# Patient Record
Sex: FEMALE | Race: WHITE | NOT HISPANIC OR LATINO | Employment: OTHER | URBAN - METROPOLITAN AREA
[De-identification: names, ages, dates, MRNs, and addresses within clinical notes are randomized per-mention and may not be internally consistent; named-entity substitution may affect disease eponyms.]

---

## 2017-01-24 ENCOUNTER — ALLSCRIPTS OFFICE VISIT (OUTPATIENT)
Dept: OTHER | Facility: OTHER | Age: 82
End: 2017-01-24

## 2017-03-14 ENCOUNTER — APPOINTMENT (EMERGENCY)
Dept: RADIOLOGY | Facility: HOSPITAL | Age: 82
DRG: 291 | End: 2017-03-14
Payer: MEDICARE

## 2017-03-14 ENCOUNTER — HOSPITAL ENCOUNTER (INPATIENT)
Facility: HOSPITAL | Age: 82
LOS: 10 days | Discharge: NURSING, REHAB OR LONG-TERM CARE | DRG: 291 | End: 2017-03-24
Attending: EMERGENCY MEDICINE | Admitting: INTERNAL MEDICINE
Payer: MEDICARE

## 2017-03-14 DIAGNOSIS — I50.9 ACUTE ON CHRONIC CONGESTIVE HEART FAILURE (HCC): Primary | ICD-10-CM

## 2017-03-14 DIAGNOSIS — R09.02 HYPOXIA: ICD-10-CM

## 2017-03-14 DIAGNOSIS — J96.01 ACUTE RESPIRATORY FAILURE WITH HYPOXIA (HCC): ICD-10-CM

## 2017-03-14 DIAGNOSIS — R93.89 ABNORMAL CHEST CT: ICD-10-CM

## 2017-03-14 PROBLEM — J44.9 COPD (CHRONIC OBSTRUCTIVE PULMONARY DISEASE) (HCC): Status: ACTIVE | Noted: 2017-03-14

## 2017-03-14 PROBLEM — I50.33 ACUTE ON CHRONIC DIASTOLIC (CONGESTIVE) HEART FAILURE (HCC): Status: ACTIVE | Noted: 2017-03-14

## 2017-03-14 PROBLEM — Z97.8 CHRONIC INDWELLING FOLEY CATHETER: Status: ACTIVE | Noted: 2017-03-14

## 2017-03-14 LAB
ANION GAP SERPL CALCULATED.3IONS-SCNC: 6 MMOL/L (ref 4–13)
BACTERIA UR QL AUTO: ABNORMAL /HPF
BASOPHILS # BLD AUTO: 0.04 THOUSANDS/ΜL (ref 0–0.1)
BASOPHILS NFR BLD AUTO: 1 % (ref 0–1)
BILIRUB UR QL STRIP: NEGATIVE
BUN SERPL-MCNC: 21 MG/DL (ref 5–25)
CALCIUM SERPL-MCNC: 9.4 MG/DL (ref 8.3–10.1)
CHLORIDE SERPL-SCNC: 99 MMOL/L (ref 100–108)
CLARITY UR: ABNORMAL
CO2 SERPL-SCNC: 37 MMOL/L (ref 21–32)
COLOR UR: ABNORMAL
CREAT SERPL-MCNC: 1.3 MG/DL (ref 0.6–1.3)
EOSINOPHIL # BLD AUTO: 0.27 THOUSAND/ΜL (ref 0–0.61)
EOSINOPHIL NFR BLD AUTO: 4 % (ref 0–6)
ERYTHROCYTE [DISTWIDTH] IN BLOOD BY AUTOMATED COUNT: 17.1 % (ref 11.6–15.1)
GFR SERPL CREATININE-BSD FRML MDRD: 39.2 ML/MIN/1.73SQ M
GLUCOSE SERPL-MCNC: 109 MG/DL (ref 65–140)
GLUCOSE SERPL-MCNC: 117 MG/DL (ref 65–140)
GLUCOSE UR STRIP-MCNC: NEGATIVE MG/DL
HCT VFR BLD AUTO: 44.1 % (ref 34.8–46.1)
HGB BLD-MCNC: 13.8 G/DL (ref 11.5–15.4)
HGB UR QL STRIP.AUTO: ABNORMAL
KETONES UR STRIP-MCNC: NEGATIVE MG/DL
LEUKOCYTE ESTERASE UR QL STRIP: ABNORMAL
LYMPHOCYTES # BLD AUTO: 1.04 THOUSANDS/ΜL (ref 0.6–4.47)
LYMPHOCYTES NFR BLD AUTO: 16 % (ref 14–44)
MCH RBC QN AUTO: 30.5 PG (ref 26.8–34.3)
MCHC RBC AUTO-ENTMCNC: 31.3 G/DL (ref 31.4–37.4)
MCV RBC AUTO: 98 FL (ref 82–98)
MONOCYTES # BLD AUTO: 0.67 THOUSAND/ΜL (ref 0.17–1.22)
MONOCYTES NFR BLD AUTO: 10 % (ref 4–12)
NEUTROPHILS # BLD AUTO: 4.41 THOUSANDS/ΜL (ref 1.85–7.62)
NEUTS SEG NFR BLD AUTO: 69 % (ref 43–75)
NITRITE UR QL STRIP: NEGATIVE
NON-SQ EPI CELLS URNS QL MICRO: ABNORMAL /HPF
NT-PROBNP SERPL-MCNC: 2059 PG/ML
PH UR STRIP.AUTO: 7.5 [PH] (ref 4.5–8)
PLATELET # BLD AUTO: 314 THOUSANDS/UL (ref 149–390)
PMV BLD AUTO: 9 FL (ref 8.9–12.7)
POTASSIUM SERPL-SCNC: 3.5 MMOL/L (ref 3.5–5.3)
PROT UR STRIP-MCNC: NEGATIVE MG/DL
RBC # BLD AUTO: 4.52 MILLION/UL (ref 3.81–5.12)
RBC #/AREA URNS AUTO: ABNORMAL /HPF
SODIUM SERPL-SCNC: 142 MMOL/L (ref 136–145)
SP GR UR STRIP.AUTO: 1.01 (ref 1–1.03)
TROPONIN I SERPL-MCNC: 0.03 NG/ML
TROPONIN I SERPL-MCNC: 0.05 NG/ML
UROBILINOGEN UR QL STRIP.AUTO: 0.2 E.U./DL
WBC # BLD AUTO: 6.43 THOUSAND/UL (ref 4.31–10.16)
WBC #/AREA URNS AUTO: ABNORMAL /HPF
WBC CLUMPS # UR AUTO: PRESENT /UL

## 2017-03-14 PROCEDURE — 87186 SC STD MICRODIL/AGAR DIL: CPT | Performed by: INTERNAL MEDICINE

## 2017-03-14 PROCEDURE — 80048 BASIC METABOLIC PNL TOTAL CA: CPT | Performed by: EMERGENCY MEDICINE

## 2017-03-14 PROCEDURE — 36415 COLL VENOUS BLD VENIPUNCTURE: CPT | Performed by: EMERGENCY MEDICINE

## 2017-03-14 PROCEDURE — 85025 COMPLETE CBC W/AUTO DIFF WBC: CPT | Performed by: EMERGENCY MEDICINE

## 2017-03-14 PROCEDURE — 84484 ASSAY OF TROPONIN QUANT: CPT | Performed by: INTERNAL MEDICINE

## 2017-03-14 PROCEDURE — 87077 CULTURE AEROBIC IDENTIFY: CPT | Performed by: INTERNAL MEDICINE

## 2017-03-14 PROCEDURE — 84484 ASSAY OF TROPONIN QUANT: CPT | Performed by: EMERGENCY MEDICINE

## 2017-03-14 PROCEDURE — 99285 EMERGENCY DEPT VISIT HI MDM: CPT

## 2017-03-14 PROCEDURE — 96374 THER/PROPH/DIAG INJ IV PUSH: CPT

## 2017-03-14 PROCEDURE — 82948 REAGENT STRIP/BLOOD GLUCOSE: CPT

## 2017-03-14 PROCEDURE — 83880 ASSAY OF NATRIURETIC PEPTIDE: CPT | Performed by: EMERGENCY MEDICINE

## 2017-03-14 PROCEDURE — 71010 HB CHEST X-RAY 1 VIEW FRONTAL (PORTABLE): CPT

## 2017-03-14 PROCEDURE — 93005 ELECTROCARDIOGRAM TRACING: CPT | Performed by: EMERGENCY MEDICINE

## 2017-03-14 PROCEDURE — 81001 URINALYSIS AUTO W/SCOPE: CPT | Performed by: INTERNAL MEDICINE

## 2017-03-14 PROCEDURE — 87086 URINE CULTURE/COLONY COUNT: CPT | Performed by: INTERNAL MEDICINE

## 2017-03-14 RX ORDER — LORAZEPAM 0.5 MG/1
0.5 TABLET ORAL EVERY 6 HOURS PRN
Status: DISCONTINUED | OUTPATIENT
Start: 2017-03-14 | End: 2017-03-24 | Stop reason: HOSPADM

## 2017-03-14 RX ORDER — ISOSORBIDE MONONITRATE 60 MG/1
60 TABLET, EXTENDED RELEASE ORAL DAILY
Status: DISCONTINUED | OUTPATIENT
Start: 2017-03-14 | End: 2017-03-20

## 2017-03-14 RX ORDER — AMIODARONE HYDROCHLORIDE 200 MG/1
200 TABLET ORAL
Status: DISCONTINUED | OUTPATIENT
Start: 2017-03-14 | End: 2017-03-16

## 2017-03-14 RX ORDER — GABAPENTIN 100 MG/1
100 CAPSULE ORAL 2 TIMES DAILY
Status: DISCONTINUED | OUTPATIENT
Start: 2017-03-14 | End: 2017-03-20

## 2017-03-14 RX ORDER — TRAZODONE HYDROCHLORIDE 50 MG/1
50 TABLET ORAL
Status: DISCONTINUED | OUTPATIENT
Start: 2017-03-14 | End: 2017-03-24 | Stop reason: HOSPADM

## 2017-03-14 RX ORDER — ONDANSETRON 2 MG/ML
4 INJECTION INTRAMUSCULAR; INTRAVENOUS EVERY 6 HOURS PRN
Status: DISCONTINUED | OUTPATIENT
Start: 2017-03-14 | End: 2017-03-24 | Stop reason: HOSPADM

## 2017-03-14 RX ORDER — POTASSIUM CHLORIDE 20 MEQ/1
20 TABLET, EXTENDED RELEASE ORAL 3 TIMES DAILY
Status: DISCONTINUED | OUTPATIENT
Start: 2017-03-14 | End: 2017-03-15

## 2017-03-14 RX ORDER — DOCUSATE SODIUM 100 MG/1
100 CAPSULE, LIQUID FILLED ORAL EVERY 8 HOURS
COMMUNITY
End: 2020-01-22 | Stop reason: HOSPADM

## 2017-03-14 RX ORDER — MINERAL OIL AND PETROLATUM 150; 830 MG/G; MG/G
2 OINTMENT OPHTHALMIC 3 TIMES DAILY PRN
Status: DISCONTINUED | OUTPATIENT
Start: 2017-03-14 | End: 2017-03-24 | Stop reason: HOSPADM

## 2017-03-14 RX ORDER — HYDROXYZINE HYDROCHLORIDE 25 MG/1
25 TABLET, FILM COATED ORAL EVERY 8 HOURS PRN
Status: DISCONTINUED | OUTPATIENT
Start: 2017-03-14 | End: 2017-03-24 | Stop reason: HOSPADM

## 2017-03-14 RX ORDER — ACETAMINOPHEN 325 MG/1
650 TABLET ORAL EVERY 4 HOURS PRN
Status: DISCONTINUED | OUTPATIENT
Start: 2017-03-14 | End: 2017-03-24 | Stop reason: HOSPADM

## 2017-03-14 RX ORDER — FUROSEMIDE 40 MG/1
40 TABLET ORAL DAILY
COMMUNITY
Start: 2017-03-14

## 2017-03-14 RX ORDER — FUROSEMIDE 10 MG/ML
60 INJECTION INTRAMUSCULAR; INTRAVENOUS
Status: DISCONTINUED | OUTPATIENT
Start: 2017-03-14 | End: 2017-03-15

## 2017-03-14 RX ORDER — LEVOTHYROXINE SODIUM 0.07 MG/1
150 TABLET ORAL
Status: DISCONTINUED | OUTPATIENT
Start: 2017-03-14 | End: 2017-03-24 | Stop reason: HOSPADM

## 2017-03-14 RX ORDER — DOCUSATE SODIUM 100 MG/1
100 CAPSULE, LIQUID FILLED ORAL EVERY 8 HOURS SCHEDULED
Status: DISCONTINUED | OUTPATIENT
Start: 2017-03-14 | End: 2017-03-24 | Stop reason: HOSPADM

## 2017-03-14 RX ORDER — NITROGLYCERIN 0.4 MG/1
0.4 TABLET SUBLINGUAL
Status: DISCONTINUED | OUTPATIENT
Start: 2017-03-14 | End: 2017-03-24 | Stop reason: HOSPADM

## 2017-03-14 RX ORDER — PRAVASTATIN SODIUM 40 MG
40 TABLET ORAL
Status: DISCONTINUED | OUTPATIENT
Start: 2017-03-14 | End: 2017-03-24 | Stop reason: HOSPADM

## 2017-03-14 RX ORDER — SPIRONOLACTONE 25 MG/1
50 TABLET ORAL DAILY
Status: DISCONTINUED | OUTPATIENT
Start: 2017-03-14 | End: 2017-03-20

## 2017-03-14 RX ORDER — FUROSEMIDE 10 MG/ML
80 INJECTION INTRAMUSCULAR; INTRAVENOUS ONCE
Status: COMPLETED | OUTPATIENT
Start: 2017-03-14 | End: 2017-03-14

## 2017-03-14 RX ADMIN — SERTRALINE HYDROCHLORIDE 50 MG: 50 TABLET ORAL at 16:03

## 2017-03-14 RX ADMIN — PRAVASTATIN SODIUM 40 MG: 40 TABLET ORAL at 16:02

## 2017-03-14 RX ADMIN — RIVAROXABAN 15 MG: 15 TABLET, FILM COATED ORAL at 16:02

## 2017-03-14 RX ADMIN — GABAPENTIN 100 MG: 100 CAPSULE ORAL at 16:04

## 2017-03-14 RX ADMIN — SPIRONOLACTONE 50 MG: 25 TABLET, FILM COATED ORAL at 16:01

## 2017-03-14 RX ADMIN — POTASSIUM CHLORIDE 20 MEQ: 1500 TABLET, EXTENDED RELEASE ORAL at 16:02

## 2017-03-14 RX ADMIN — FUROSEMIDE 60 MG: 10 INJECTION, SOLUTION INTRAMUSCULAR; INTRAVENOUS at 16:31

## 2017-03-14 RX ADMIN — LEVOTHYROXINE SODIUM 150 MCG: 75 TABLET ORAL at 16:01

## 2017-03-14 RX ADMIN — FUROSEMIDE 80 MG: 10 INJECTION, SOLUTION INTRAMUSCULAR; INTRAVENOUS at 12:28

## 2017-03-14 RX ADMIN — ISOSORBIDE MONONITRATE 60 MG: 60 TABLET, EXTENDED RELEASE ORAL at 16:01

## 2017-03-14 RX ADMIN — AMIODARONE HYDROCHLORIDE 200 MG: 200 TABLET ORAL at 16:01

## 2017-03-14 RX ADMIN — TRAZODONE HYDROCHLORIDE 50 MG: 50 TABLET ORAL at 21:42

## 2017-03-14 RX ADMIN — POTASSIUM CHLORIDE 20 MEQ: 1500 TABLET, EXTENDED RELEASE ORAL at 21:42

## 2017-03-14 RX ADMIN — LORAZEPAM 0.5 MG: 0.5 TABLET ORAL at 16:12

## 2017-03-14 RX ADMIN — DOCUSATE SODIUM 100 MG: 100 CAPSULE, LIQUID FILLED ORAL at 21:42

## 2017-03-14 RX ADMIN — METOPROLOL TARTRATE 12.5 MG: 25 TABLET ORAL at 16:02

## 2017-03-15 ENCOUNTER — APPOINTMENT (INPATIENT)
Dept: OCCUPATIONAL THERAPY | Facility: HOSPITAL | Age: 82
DRG: 291 | End: 2017-03-15
Payer: MEDICARE

## 2017-03-15 ENCOUNTER — APPOINTMENT (INPATIENT)
Dept: PHYSICAL THERAPY | Facility: HOSPITAL | Age: 82
DRG: 291 | End: 2017-03-15
Payer: MEDICARE

## 2017-03-15 LAB
ANION GAP SERPL CALCULATED.3IONS-SCNC: 4 MMOL/L (ref 4–13)
ATRIAL RATE: 73 BPM
BUN SERPL-MCNC: 18 MG/DL (ref 5–25)
CALCIUM SERPL-MCNC: 8.5 MG/DL (ref 8.3–10.1)
CHLORIDE SERPL-SCNC: 101 MMOL/L (ref 100–108)
CO2 SERPL-SCNC: 37 MMOL/L (ref 21–32)
CREAT SERPL-MCNC: 1.35 MG/DL (ref 0.6–1.3)
GFR SERPL CREATININE-BSD FRML MDRD: 37.5 ML/MIN/1.73SQ M
GLUCOSE SERPL-MCNC: 95 MG/DL (ref 65–140)
P AXIS: 61 DEGREES
POTASSIUM SERPL-SCNC: 3.2 MMOL/L (ref 3.5–5.3)
PR INTERVAL: 174 MS
QRS AXIS: 48 DEGREES
QRSD INTERVAL: 78 MS
QT INTERVAL: 444 MS
QTC INTERVAL: 489 MS
SODIUM SERPL-SCNC: 142 MMOL/L (ref 136–145)
T WAVE AXIS: 78 DEGREES
VENTRICULAR RATE: 73 BPM

## 2017-03-15 PROCEDURE — G8987 SELF CARE CURRENT STATUS: HCPCS

## 2017-03-15 PROCEDURE — G8988 SELF CARE GOAL STATUS: HCPCS

## 2017-03-15 PROCEDURE — 97166 OT EVAL MOD COMPLEX 45 MIN: CPT

## 2017-03-15 PROCEDURE — 94640 AIRWAY INHALATION TREATMENT: CPT

## 2017-03-15 PROCEDURE — G8979 MOBILITY GOAL STATUS: HCPCS

## 2017-03-15 PROCEDURE — 94760 N-INVAS EAR/PLS OXIMETRY 1: CPT

## 2017-03-15 PROCEDURE — 97163 PT EVAL HIGH COMPLEX 45 MIN: CPT

## 2017-03-15 PROCEDURE — G8978 MOBILITY CURRENT STATUS: HCPCS

## 2017-03-15 PROCEDURE — 80048 BASIC METABOLIC PNL TOTAL CA: CPT | Performed by: NURSE PRACTITIONER

## 2017-03-15 RX ORDER — POTASSIUM CHLORIDE 20 MEQ/1
40 TABLET, EXTENDED RELEASE ORAL 3 TIMES DAILY
Status: DISCONTINUED | OUTPATIENT
Start: 2017-03-15 | End: 2017-03-20

## 2017-03-15 RX ORDER — BUDESONIDE AND FORMOTEROL FUMARATE DIHYDRATE 160; 4.5 UG/1; UG/1
2 AEROSOL RESPIRATORY (INHALATION)
Status: DISCONTINUED | OUTPATIENT
Start: 2017-03-15 | End: 2017-03-20

## 2017-03-15 RX ORDER — FUROSEMIDE 10 MG/ML
80 INJECTION INTRAMUSCULAR; INTRAVENOUS
Status: DISCONTINUED | OUTPATIENT
Start: 2017-03-15 | End: 2017-03-16

## 2017-03-15 RX ADMIN — SPIRONOLACTONE 50 MG: 25 TABLET, FILM COATED ORAL at 08:22

## 2017-03-15 RX ADMIN — BUDESONIDE AND FORMOTEROL FUMARATE DIHYDRATE 2 PUFF: 160; 4.5 AEROSOL RESPIRATORY (INHALATION) at 23:05

## 2017-03-15 RX ADMIN — POTASSIUM CHLORIDE 40 MEQ: 1500 TABLET, EXTENDED RELEASE ORAL at 21:06

## 2017-03-15 RX ADMIN — TRAZODONE HYDROCHLORIDE 50 MG: 50 TABLET ORAL at 21:09

## 2017-03-15 RX ADMIN — METOPROLOL TARTRATE 12.5 MG: 25 TABLET ORAL at 08:22

## 2017-03-15 RX ADMIN — PRAVASTATIN SODIUM 40 MG: 40 TABLET ORAL at 16:17

## 2017-03-15 RX ADMIN — SERTRALINE HYDROCHLORIDE 50 MG: 50 TABLET ORAL at 08:22

## 2017-03-15 RX ADMIN — GABAPENTIN 100 MG: 100 CAPSULE ORAL at 08:22

## 2017-03-15 RX ADMIN — FUROSEMIDE 80 MG: 10 INJECTION, SOLUTION INTRAMUSCULAR; INTRAVENOUS at 16:17

## 2017-03-15 RX ADMIN — POTASSIUM CHLORIDE 40 MEQ: 1500 TABLET, EXTENDED RELEASE ORAL at 16:17

## 2017-03-15 RX ADMIN — DOCUSATE SODIUM 100 MG: 100 CAPSULE, LIQUID FILLED ORAL at 06:12

## 2017-03-15 RX ADMIN — AMIODARONE HYDROCHLORIDE 200 MG: 200 TABLET ORAL at 08:22

## 2017-03-15 RX ADMIN — POTASSIUM CHLORIDE 40 MEQ: 1500 TABLET, EXTENDED RELEASE ORAL at 08:19

## 2017-03-15 RX ADMIN — RIVAROXABAN 15 MG: 15 TABLET, FILM COATED ORAL at 08:23

## 2017-03-15 RX ADMIN — DOCUSATE SODIUM 100 MG: 100 CAPSULE, LIQUID FILLED ORAL at 21:09

## 2017-03-15 RX ADMIN — LORAZEPAM 0.5 MG: 0.5 TABLET ORAL at 11:51

## 2017-03-15 RX ADMIN — HYDROXYZINE HYDROCHLORIDE 25 MG: 25 TABLET, FILM COATED ORAL at 08:23

## 2017-03-15 RX ADMIN — LORAZEPAM 0.5 MG: 0.5 TABLET ORAL at 17:51

## 2017-03-15 RX ADMIN — GABAPENTIN 100 MG: 100 CAPSULE ORAL at 17:43

## 2017-03-15 RX ADMIN — BUDESONIDE AND FORMOTEROL FUMARATE DIHYDRATE 2 PUFF: 160; 4.5 AEROSOL RESPIRATORY (INHALATION) at 09:03

## 2017-03-15 RX ADMIN — ISOSORBIDE MONONITRATE 60 MG: 60 TABLET, EXTENDED RELEASE ORAL at 08:23

## 2017-03-15 RX ADMIN — TIOTROPIUM BROMIDE 18 MCG: 18 CAPSULE ORAL; RESPIRATORY (INHALATION) at 09:03

## 2017-03-15 RX ADMIN — FUROSEMIDE 60 MG: 10 INJECTION, SOLUTION INTRAMUSCULAR; INTRAVENOUS at 08:19

## 2017-03-15 RX ADMIN — LEVOTHYROXINE SODIUM 150 MCG: 75 TABLET ORAL at 06:11

## 2017-03-16 ENCOUNTER — APPOINTMENT (INPATIENT)
Dept: OCCUPATIONAL THERAPY | Facility: HOSPITAL | Age: 82
DRG: 291 | End: 2017-03-16
Payer: MEDICARE

## 2017-03-16 ENCOUNTER — APPOINTMENT (INPATIENT)
Dept: NON INVASIVE DIAGNOSTICS | Facility: HOSPITAL | Age: 82
DRG: 291 | End: 2017-03-16
Payer: MEDICARE

## 2017-03-16 ENCOUNTER — APPOINTMENT (INPATIENT)
Dept: RADIOLOGY | Facility: HOSPITAL | Age: 82
DRG: 291 | End: 2017-03-16
Payer: MEDICARE

## 2017-03-16 ENCOUNTER — GENERIC CONVERSION - ENCOUNTER (OUTPATIENT)
Dept: OTHER | Facility: OTHER | Age: 82
End: 2017-03-16

## 2017-03-16 ENCOUNTER — APPOINTMENT (INPATIENT)
Dept: CT IMAGING | Facility: HOSPITAL | Age: 82
DRG: 291 | End: 2017-03-16
Payer: MEDICARE

## 2017-03-16 ENCOUNTER — APPOINTMENT (INPATIENT)
Dept: PHYSICAL THERAPY | Facility: HOSPITAL | Age: 82
DRG: 291 | End: 2017-03-16
Payer: MEDICARE

## 2017-03-16 LAB
ANION GAP SERPL CALCULATED.3IONS-SCNC: 5 MMOL/L (ref 4–13)
BACTERIA UR CULT: NORMAL
BACTERIA UR CULT: NORMAL
BUN SERPL-MCNC: 18 MG/DL (ref 5–25)
CALCIUM SERPL-MCNC: 8.8 MG/DL (ref 8.3–10.1)
CHLORIDE SERPL-SCNC: 101 MMOL/L (ref 100–108)
CO2 SERPL-SCNC: 35 MMOL/L (ref 21–32)
CREAT SERPL-MCNC: 1.18 MG/DL (ref 0.6–1.3)
CRP SERPL QL: 42.7 MG/L
ERYTHROCYTE [DISTWIDTH] IN BLOOD BY AUTOMATED COUNT: 17 % (ref 11.6–15.1)
ERYTHROCYTE [SEDIMENTATION RATE] IN BLOOD: 91 MM/HOUR (ref 0–15)
GFR SERPL CREATININE-BSD FRML MDRD: 43.9 ML/MIN/1.73SQ M
GLUCOSE SERPL-MCNC: 92 MG/DL (ref 65–140)
HCT VFR BLD AUTO: 41.2 % (ref 34.8–46.1)
HGB BLD-MCNC: 12.5 G/DL (ref 11.5–15.4)
MCH RBC QN AUTO: 29.8 PG (ref 26.8–34.3)
MCHC RBC AUTO-ENTMCNC: 30.3 G/DL (ref 31.4–37.4)
MCV RBC AUTO: 98 FL (ref 82–98)
PLATELET # BLD AUTO: 328 THOUSANDS/UL (ref 149–390)
PMV BLD AUTO: 9 FL (ref 8.9–12.7)
POTASSIUM SERPL-SCNC: 3.8 MMOL/L (ref 3.5–5.3)
RBC # BLD AUTO: 4.2 MILLION/UL (ref 3.81–5.12)
SODIUM SERPL-SCNC: 141 MMOL/L (ref 136–145)
WBC # BLD AUTO: 5.91 THOUSAND/UL (ref 4.31–10.16)

## 2017-03-16 PROCEDURE — 86671 FUNGUS NES ANTIBODY: CPT | Performed by: INTERNAL MEDICINE

## 2017-03-16 PROCEDURE — 86140 C-REACTIVE PROTEIN: CPT | Performed by: INTERNAL MEDICINE

## 2017-03-16 PROCEDURE — 86200 CCP ANTIBODY: CPT | Performed by: INTERNAL MEDICINE

## 2017-03-16 PROCEDURE — 86602 ANTINOMYCES ANTIBODY: CPT | Performed by: INTERNAL MEDICINE

## 2017-03-16 PROCEDURE — 85027 COMPLETE CBC AUTOMATED: CPT | Performed by: INTERNAL MEDICINE

## 2017-03-16 PROCEDURE — 85652 RBC SED RATE AUTOMATED: CPT | Performed by: INTERNAL MEDICINE

## 2017-03-16 PROCEDURE — 86331 IMMUNODIFFUSION OUCHTERLONY: CPT | Performed by: INTERNAL MEDICINE

## 2017-03-16 PROCEDURE — 86606 ASPERGILLUS ANTIBODY: CPT | Performed by: INTERNAL MEDICINE

## 2017-03-16 PROCEDURE — 97530 THERAPEUTIC ACTIVITIES: CPT

## 2017-03-16 PROCEDURE — 71250 CT THORAX DX C-: CPT

## 2017-03-16 PROCEDURE — 94640 AIRWAY INHALATION TREATMENT: CPT

## 2017-03-16 PROCEDURE — 86225 DNA ANTIBODY NATIVE: CPT | Performed by: INTERNAL MEDICINE

## 2017-03-16 PROCEDURE — 80048 BASIC METABOLIC PNL TOTAL CA: CPT | Performed by: INTERNAL MEDICINE

## 2017-03-16 PROCEDURE — 94760 N-INVAS EAR/PLS OXIMETRY 1: CPT

## 2017-03-16 PROCEDURE — 86038 ANTINUCLEAR ANTIBODIES: CPT | Performed by: INTERNAL MEDICINE

## 2017-03-16 PROCEDURE — 93306 TTE W/DOPPLER COMPLETE: CPT

## 2017-03-16 PROCEDURE — 86255 FLUORESCENT ANTIBODY SCREEN: CPT | Performed by: INTERNAL MEDICINE

## 2017-03-16 PROCEDURE — 86235 NUCLEAR ANTIGEN ANTIBODY: CPT | Performed by: INTERNAL MEDICINE

## 2017-03-16 PROCEDURE — 86430 RHEUMATOID FACTOR TEST QUAL: CPT | Performed by: INTERNAL MEDICINE

## 2017-03-16 PROCEDURE — 83520 IMMUNOASSAY QUANT NOS NONAB: CPT | Performed by: INTERNAL MEDICINE

## 2017-03-16 RX ORDER — FUROSEMIDE 10 MG/ML
80 INJECTION INTRAMUSCULAR; INTRAVENOUS ONCE
Status: COMPLETED | OUTPATIENT
Start: 2017-03-16 | End: 2017-03-16

## 2017-03-16 RX ORDER — FUROSEMIDE 80 MG
80 TABLET ORAL DAILY
Status: DISCONTINUED | OUTPATIENT
Start: 2017-03-17 | End: 2017-03-20

## 2017-03-16 RX ADMIN — LORAZEPAM 0.5 MG: 0.5 TABLET ORAL at 15:20

## 2017-03-16 RX ADMIN — LEVOTHYROXINE SODIUM 150 MCG: 75 TABLET ORAL at 06:11

## 2017-03-16 RX ADMIN — GABAPENTIN 100 MG: 100 CAPSULE ORAL at 17:35

## 2017-03-16 RX ADMIN — RIVAROXABAN 15 MG: 15 TABLET, FILM COATED ORAL at 10:13

## 2017-03-16 RX ADMIN — POTASSIUM CHLORIDE 40 MEQ: 1500 TABLET, EXTENDED RELEASE ORAL at 09:03

## 2017-03-16 RX ADMIN — LORAZEPAM 0.5 MG: 0.5 TABLET ORAL at 06:15

## 2017-03-16 RX ADMIN — HYDROXYZINE HYDROCHLORIDE 25 MG: 25 TABLET, FILM COATED ORAL at 15:22

## 2017-03-16 RX ADMIN — SERTRALINE HYDROCHLORIDE 50 MG: 50 TABLET ORAL at 09:05

## 2017-03-16 RX ADMIN — BUDESONIDE AND FORMOTEROL FUMARATE DIHYDRATE 2 PUFF: 160; 4.5 AEROSOL RESPIRATORY (INHALATION) at 21:06

## 2017-03-16 RX ADMIN — GABAPENTIN 100 MG: 100 CAPSULE ORAL at 09:04

## 2017-03-16 RX ADMIN — ISOSORBIDE MONONITRATE 60 MG: 60 TABLET, EXTENDED RELEASE ORAL at 09:04

## 2017-03-16 RX ADMIN — METOPROLOL TARTRATE 12.5 MG: 25 TABLET ORAL at 09:03

## 2017-03-16 RX ADMIN — FUROSEMIDE 80 MG: 10 INJECTION, SOLUTION INTRAMUSCULAR; INTRAVENOUS at 15:55

## 2017-03-16 RX ADMIN — HYDROXYZINE HYDROCHLORIDE 25 MG: 25 TABLET, FILM COATED ORAL at 06:11

## 2017-03-16 RX ADMIN — AMIODARONE HYDROCHLORIDE 200 MG: 200 TABLET ORAL at 09:04

## 2017-03-16 RX ADMIN — SPIRONOLACTONE 50 MG: 25 TABLET, FILM COATED ORAL at 09:03

## 2017-03-16 RX ADMIN — DOCUSATE SODIUM 100 MG: 100 CAPSULE, LIQUID FILLED ORAL at 22:08

## 2017-03-16 RX ADMIN — FUROSEMIDE 80 MG: 10 INJECTION, SOLUTION INTRAMUSCULAR; INTRAVENOUS at 09:02

## 2017-03-16 RX ADMIN — TRAZODONE HYDROCHLORIDE 50 MG: 50 TABLET ORAL at 22:08

## 2017-03-16 RX ADMIN — BUDESONIDE AND FORMOTEROL FUMARATE DIHYDRATE 2 PUFF: 160; 4.5 AEROSOL RESPIRATORY (INHALATION) at 08:39

## 2017-03-16 RX ADMIN — POTASSIUM CHLORIDE 40 MEQ: 1500 TABLET, EXTENDED RELEASE ORAL at 17:54

## 2017-03-16 RX ADMIN — PRAVASTATIN SODIUM 40 MG: 40 TABLET ORAL at 17:35

## 2017-03-16 RX ADMIN — POTASSIUM CHLORIDE 40 MEQ: 1500 TABLET, EXTENDED RELEASE ORAL at 22:08

## 2017-03-16 RX ADMIN — DOCUSATE SODIUM 100 MG: 100 CAPSULE, LIQUID FILLED ORAL at 06:12

## 2017-03-16 RX ADMIN — TIOTROPIUM BROMIDE 18 MCG: 18 CAPSULE ORAL; RESPIRATORY (INHALATION) at 08:39

## 2017-03-17 ENCOUNTER — ANESTHESIA EVENT (INPATIENT)
Dept: PERIOP | Facility: HOSPITAL | Age: 82
DRG: 291 | End: 2017-03-17
Payer: MEDICARE

## 2017-03-17 ENCOUNTER — APPOINTMENT (INPATIENT)
Dept: OCCUPATIONAL THERAPY | Facility: HOSPITAL | Age: 82
DRG: 291 | End: 2017-03-17
Payer: MEDICARE

## 2017-03-17 LAB
ANION GAP SERPL CALCULATED.3IONS-SCNC: 6 MMOL/L (ref 4–13)
BUN SERPL-MCNC: 20 MG/DL (ref 5–25)
CALCIUM SERPL-MCNC: 8.8 MG/DL (ref 8.3–10.1)
CHLORIDE SERPL-SCNC: 98 MMOL/L (ref 100–108)
CO2 SERPL-SCNC: 34 MMOL/L (ref 21–32)
CREAT SERPL-MCNC: 1.25 MG/DL (ref 0.6–1.3)
DSDNA AB SER-ACNC: <1 IU/ML (ref 0–9)
ENA SCL70 AB SER-ACNC: <0.2 AI (ref 0–0.9)
ENA SS-A AB SER-ACNC: <0.2 AI (ref 0–0.9)
ENA SS-B AB SER-ACNC: <0.2 AI (ref 0–0.9)
GFR SERPL CREATININE-BSD FRML MDRD: 41 ML/MIN/1.73SQ M
GLUCOSE SERPL-MCNC: 126 MG/DL (ref 65–140)
POTASSIUM SERPL-SCNC: 4.5 MMOL/L (ref 3.5–5.3)
RHEUMATOID FACT SER QL LA: NEGATIVE
RYE IGE QN: NEGATIVE
SODIUM SERPL-SCNC: 138 MMOL/L (ref 136–145)

## 2017-03-17 PROCEDURE — 94760 N-INVAS EAR/PLS OXIMETRY 1: CPT

## 2017-03-17 PROCEDURE — 80048 BASIC METABOLIC PNL TOTAL CA: CPT | Performed by: INTERNAL MEDICINE

## 2017-03-17 PROCEDURE — 94640 AIRWAY INHALATION TREATMENT: CPT

## 2017-03-17 RX ORDER — NITROFURANTOIN 25; 75 MG/1; MG/1
100 CAPSULE ORAL 2 TIMES DAILY WITH MEALS
Status: DISCONTINUED | OUTPATIENT
Start: 2017-03-17 | End: 2017-03-19

## 2017-03-17 RX ORDER — METHYLPREDNISOLONE SODIUM SUCCINATE 40 MG/ML
INJECTION, POWDER, LYOPHILIZED, FOR SOLUTION INTRAMUSCULAR; INTRAVENOUS
Status: COMPLETED
Start: 2017-03-17 | End: 2017-03-17

## 2017-03-17 RX ADMIN — DOCUSATE SODIUM 100 MG: 100 CAPSULE, LIQUID FILLED ORAL at 13:41

## 2017-03-17 RX ADMIN — HYDROXYZINE HYDROCHLORIDE 25 MG: 25 TABLET, FILM COATED ORAL at 10:36

## 2017-03-17 RX ADMIN — PRAVASTATIN SODIUM 40 MG: 40 TABLET ORAL at 16:58

## 2017-03-17 RX ADMIN — LEVOTHYROXINE SODIUM 150 MCG: 75 TABLET ORAL at 05:49

## 2017-03-17 RX ADMIN — NITROFURANTOIN (MONOHYDRATE/MACROCRYSTALS) 100 MG: 75; 25 CAPSULE ORAL at 16:58

## 2017-03-17 RX ADMIN — DOCUSATE SODIUM 100 MG: 100 CAPSULE, LIQUID FILLED ORAL at 21:01

## 2017-03-17 RX ADMIN — BUDESONIDE AND FORMOTEROL FUMARATE DIHYDRATE 2 PUFF: 160; 4.5 AEROSOL RESPIRATORY (INHALATION) at 07:22

## 2017-03-17 RX ADMIN — DOCUSATE SODIUM 100 MG: 100 CAPSULE, LIQUID FILLED ORAL at 05:49

## 2017-03-17 RX ADMIN — BUDESONIDE AND FORMOTEROL FUMARATE DIHYDRATE 2 PUFF: 160; 4.5 AEROSOL RESPIRATORY (INHALATION) at 21:50

## 2017-03-17 RX ADMIN — ISOSORBIDE MONONITRATE 60 MG: 60 TABLET, EXTENDED RELEASE ORAL at 08:29

## 2017-03-17 RX ADMIN — GABAPENTIN 100 MG: 100 CAPSULE ORAL at 08:29

## 2017-03-17 RX ADMIN — FUROSEMIDE 80 MG: 80 TABLET ORAL at 08:30

## 2017-03-17 RX ADMIN — TRAZODONE HYDROCHLORIDE 50 MG: 50 TABLET ORAL at 21:01

## 2017-03-17 RX ADMIN — SERTRALINE HYDROCHLORIDE 50 MG: 50 TABLET ORAL at 08:30

## 2017-03-17 RX ADMIN — GABAPENTIN 100 MG: 100 CAPSULE ORAL at 17:00

## 2017-03-17 RX ADMIN — NITROFURANTOIN (MONOHYDRATE/MACROCRYSTALS) 100 MG: 75; 25 CAPSULE ORAL at 09:31

## 2017-03-17 RX ADMIN — POTASSIUM CHLORIDE 40 MEQ: 1500 TABLET, EXTENDED RELEASE ORAL at 21:00

## 2017-03-17 RX ADMIN — TIOTROPIUM BROMIDE 18 MCG: 18 CAPSULE ORAL; RESPIRATORY (INHALATION) at 07:22

## 2017-03-17 RX ADMIN — METHYLPREDNISOLONE SODIUM SUCCINATE 40 MG: 40 INJECTION, POWDER, FOR SOLUTION INTRAMUSCULAR; INTRAVENOUS at 01:30

## 2017-03-17 RX ADMIN — LORAZEPAM 0.5 MG: 0.5 TABLET ORAL at 13:41

## 2017-03-17 RX ADMIN — POTASSIUM CHLORIDE 40 MEQ: 1500 TABLET, EXTENDED RELEASE ORAL at 16:58

## 2017-03-17 RX ADMIN — SPIRONOLACTONE 50 MG: 25 TABLET, FILM COATED ORAL at 08:29

## 2017-03-17 RX ADMIN — POTASSIUM CHLORIDE 40 MEQ: 1500 TABLET, EXTENDED RELEASE ORAL at 08:30

## 2017-03-17 RX ADMIN — METOPROLOL TARTRATE 12.5 MG: 25 TABLET ORAL at 08:30

## 2017-03-17 RX ADMIN — RIVAROXABAN 15 MG: 15 TABLET, FILM COATED ORAL at 08:29

## 2017-03-18 PROBLEM — J84.9 INTERSTITIAL LUNG DISEASE (HCC): Status: ACTIVE | Noted: 2017-03-18

## 2017-03-18 LAB
ANION GAP SERPL CALCULATED.3IONS-SCNC: 4 MMOL/L (ref 4–13)
BUN SERPL-MCNC: 24 MG/DL (ref 5–25)
CALCIUM SERPL-MCNC: 8.8 MG/DL (ref 8.3–10.1)
CCP IGA+IGG SERPL IA-ACNC: 12 UNITS (ref 0–19)
CHLORIDE SERPL-SCNC: 103 MMOL/L (ref 100–108)
CO2 SERPL-SCNC: 34 MMOL/L (ref 21–32)
CREAT SERPL-MCNC: 1.11 MG/DL (ref 0.6–1.3)
GFR SERPL CREATININE-BSD FRML MDRD: 47.1 ML/MIN/1.73SQ M
GLUCOSE SERPL-MCNC: 109 MG/DL (ref 65–140)
POTASSIUM SERPL-SCNC: 4.5 MMOL/L (ref 3.5–5.3)
SODIUM SERPL-SCNC: 141 MMOL/L (ref 136–145)

## 2017-03-18 PROCEDURE — 94640 AIRWAY INHALATION TREATMENT: CPT

## 2017-03-18 PROCEDURE — 94760 N-INVAS EAR/PLS OXIMETRY 1: CPT

## 2017-03-18 PROCEDURE — 80048 BASIC METABOLIC PNL TOTAL CA: CPT | Performed by: INTERNAL MEDICINE

## 2017-03-18 RX ADMIN — ENOXAPARIN SODIUM 40 MG: 40 INJECTION SUBCUTANEOUS at 09:59

## 2017-03-18 RX ADMIN — DOCUSATE SODIUM 100 MG: 100 CAPSULE, LIQUID FILLED ORAL at 05:54

## 2017-03-18 RX ADMIN — HYDROXYZINE HYDROCHLORIDE 25 MG: 25 TABLET, FILM COATED ORAL at 06:26

## 2017-03-18 RX ADMIN — BUDESONIDE AND FORMOTEROL FUMARATE DIHYDRATE 2 PUFF: 160; 4.5 AEROSOL RESPIRATORY (INHALATION) at 20:24

## 2017-03-18 RX ADMIN — PRAVASTATIN SODIUM 40 MG: 40 TABLET ORAL at 16:04

## 2017-03-18 RX ADMIN — ISOSORBIDE MONONITRATE 60 MG: 60 TABLET, EXTENDED RELEASE ORAL at 09:58

## 2017-03-18 RX ADMIN — BUDESONIDE AND FORMOTEROL FUMARATE DIHYDRATE 2 PUFF: 160; 4.5 AEROSOL RESPIRATORY (INHALATION) at 08:29

## 2017-03-18 RX ADMIN — LORAZEPAM 0.5 MG: 0.5 TABLET ORAL at 16:03

## 2017-03-18 RX ADMIN — SPIRONOLACTONE 50 MG: 25 TABLET, FILM COATED ORAL at 09:58

## 2017-03-18 RX ADMIN — FUROSEMIDE 80 MG: 80 TABLET ORAL at 09:58

## 2017-03-18 RX ADMIN — TIOTROPIUM BROMIDE 18 MCG: 18 CAPSULE ORAL; RESPIRATORY (INHALATION) at 08:29

## 2017-03-18 RX ADMIN — POTASSIUM CHLORIDE 40 MEQ: 1500 TABLET, EXTENDED RELEASE ORAL at 21:28

## 2017-03-18 RX ADMIN — SERTRALINE HYDROCHLORIDE 50 MG: 50 TABLET ORAL at 09:58

## 2017-03-18 RX ADMIN — HYDROXYZINE HYDROCHLORIDE 25 MG: 25 TABLET, FILM COATED ORAL at 23:46

## 2017-03-18 RX ADMIN — METOPROLOL TARTRATE 12.5 MG: 25 TABLET ORAL at 09:58

## 2017-03-18 RX ADMIN — NITROFURANTOIN (MONOHYDRATE/MACROCRYSTALS) 100 MG: 75; 25 CAPSULE ORAL at 16:06

## 2017-03-18 RX ADMIN — DOCUSATE SODIUM 100 MG: 100 CAPSULE, LIQUID FILLED ORAL at 21:28

## 2017-03-18 RX ADMIN — ACETAMINOPHEN 650 MG: 325 TABLET, FILM COATED ORAL at 20:21

## 2017-03-18 RX ADMIN — NITROFURANTOIN (MONOHYDRATE/MACROCRYSTALS) 100 MG: 75; 25 CAPSULE ORAL at 10:01

## 2017-03-18 RX ADMIN — POTASSIUM CHLORIDE 40 MEQ: 1500 TABLET, EXTENDED RELEASE ORAL at 16:04

## 2017-03-18 RX ADMIN — HYDROXYZINE HYDROCHLORIDE 25 MG: 25 TABLET, FILM COATED ORAL at 15:17

## 2017-03-18 RX ADMIN — GABAPENTIN 100 MG: 100 CAPSULE ORAL at 16:03

## 2017-03-18 RX ADMIN — POTASSIUM CHLORIDE 40 MEQ: 1500 TABLET, EXTENDED RELEASE ORAL at 09:58

## 2017-03-18 RX ADMIN — GABAPENTIN 100 MG: 100 CAPSULE ORAL at 09:58

## 2017-03-18 RX ADMIN — LEVOTHYROXINE SODIUM 150 MCG: 75 TABLET ORAL at 05:54

## 2017-03-18 RX ADMIN — TRAZODONE HYDROCHLORIDE 50 MG: 50 TABLET ORAL at 21:28

## 2017-03-19 PROCEDURE — 94760 N-INVAS EAR/PLS OXIMETRY 1: CPT

## 2017-03-19 PROCEDURE — 94640 AIRWAY INHALATION TREATMENT: CPT

## 2017-03-19 RX ORDER — LORAZEPAM 2 MG/ML
1 INJECTION INTRAMUSCULAR ONCE
Status: COMPLETED | OUTPATIENT
Start: 2017-03-19 | End: 2017-03-19

## 2017-03-19 RX ORDER — TRAMADOL HYDROCHLORIDE 50 MG/1
50 TABLET ORAL EVERY 6 HOURS PRN
Status: DISCONTINUED | OUTPATIENT
Start: 2017-03-19 | End: 2017-03-24 | Stop reason: HOSPADM

## 2017-03-19 RX ORDER — TEMAZEPAM 15 MG/1
15 CAPSULE ORAL
Status: DISCONTINUED | OUTPATIENT
Start: 2017-03-19 | End: 2017-03-24 | Stop reason: HOSPADM

## 2017-03-19 RX ADMIN — DOCUSATE SODIUM 100 MG: 100 CAPSULE, LIQUID FILLED ORAL at 05:08

## 2017-03-19 RX ADMIN — ENOXAPARIN SODIUM 40 MG: 40 INJECTION SUBCUTANEOUS at 08:57

## 2017-03-19 RX ADMIN — SPIRONOLACTONE 50 MG: 25 TABLET, FILM COATED ORAL at 08:57

## 2017-03-19 RX ADMIN — ISOSORBIDE MONONITRATE 60 MG: 60 TABLET, EXTENDED RELEASE ORAL at 08:57

## 2017-03-19 RX ADMIN — GABAPENTIN 100 MG: 100 CAPSULE ORAL at 17:47

## 2017-03-19 RX ADMIN — LORAZEPAM 1 MG: 2 INJECTION, SOLUTION INTRAMUSCULAR; INTRAVENOUS at 12:05

## 2017-03-19 RX ADMIN — POTASSIUM CHLORIDE 40 MEQ: 1500 TABLET, EXTENDED RELEASE ORAL at 22:59

## 2017-03-19 RX ADMIN — DOCUSATE SODIUM 100 MG: 100 CAPSULE, LIQUID FILLED ORAL at 22:59

## 2017-03-19 RX ADMIN — HYDROXYZINE HYDROCHLORIDE 25 MG: 25 TABLET, FILM COATED ORAL at 11:51

## 2017-03-19 RX ADMIN — LORAZEPAM 0.5 MG: 0.5 TABLET ORAL at 11:03

## 2017-03-19 RX ADMIN — HYDROXYZINE HYDROCHLORIDE 25 MG: 25 TABLET, FILM COATED ORAL at 22:59

## 2017-03-19 RX ADMIN — PRAVASTATIN SODIUM 40 MG: 40 TABLET ORAL at 17:46

## 2017-03-19 RX ADMIN — TRAZODONE HYDROCHLORIDE 50 MG: 50 TABLET ORAL at 22:59

## 2017-03-19 RX ADMIN — ACETAMINOPHEN 650 MG: 325 TABLET, FILM COATED ORAL at 11:25

## 2017-03-19 RX ADMIN — FUROSEMIDE 80 MG: 80 TABLET ORAL at 08:57

## 2017-03-19 RX ADMIN — BUDESONIDE AND FORMOTEROL FUMARATE DIHYDRATE 2 PUFF: 160; 4.5 AEROSOL RESPIRATORY (INHALATION) at 07:39

## 2017-03-19 RX ADMIN — POTASSIUM CHLORIDE 40 MEQ: 1500 TABLET, EXTENDED RELEASE ORAL at 17:46

## 2017-03-19 RX ADMIN — DOCUSATE SODIUM 100 MG: 100 CAPSULE, LIQUID FILLED ORAL at 10:00

## 2017-03-19 RX ADMIN — GABAPENTIN 100 MG: 100 CAPSULE ORAL at 08:57

## 2017-03-19 RX ADMIN — TIOTROPIUM BROMIDE 18 MCG: 18 CAPSULE ORAL; RESPIRATORY (INHALATION) at 07:39

## 2017-03-19 RX ADMIN — ACETAMINOPHEN 650 MG: 325 TABLET, FILM COATED ORAL at 17:45

## 2017-03-19 RX ADMIN — NITROFURANTOIN (MONOHYDRATE/MACROCRYSTALS) 100 MG: 75; 25 CAPSULE ORAL at 08:54

## 2017-03-19 RX ADMIN — BUDESONIDE AND FORMOTEROL FUMARATE DIHYDRATE 2 PUFF: 160; 4.5 AEROSOL RESPIRATORY (INHALATION) at 19:47

## 2017-03-19 RX ADMIN — POTASSIUM CHLORIDE 40 MEQ: 1500 TABLET, EXTENDED RELEASE ORAL at 08:57

## 2017-03-19 RX ADMIN — METOPROLOL TARTRATE 12.5 MG: 25 TABLET ORAL at 08:56

## 2017-03-19 RX ADMIN — LORAZEPAM 0.5 MG: 0.5 TABLET ORAL at 01:18

## 2017-03-19 RX ADMIN — LEVOTHYROXINE SODIUM 150 MCG: 75 TABLET ORAL at 05:08

## 2017-03-19 RX ADMIN — SERTRALINE HYDROCHLORIDE 50 MG: 50 TABLET ORAL at 08:56

## 2017-03-20 ENCOUNTER — ANESTHESIA (INPATIENT)
Dept: PERIOP | Facility: HOSPITAL | Age: 82
DRG: 291 | End: 2017-03-20
Payer: MEDICARE

## 2017-03-20 ENCOUNTER — GENERIC CONVERSION - ENCOUNTER (OUTPATIENT)
Dept: OTHER | Facility: OTHER | Age: 82
End: 2017-03-20

## 2017-03-20 LAB
A FUMIGATUS1 AB SER QL ID: NEGATIVE
A PULLULANS AB SER QL: POSITIVE
APPEARANCE FLD: ABNORMAL
C-ANCA TITR SER IF: NORMAL TITER
COLOR FLD: COLORLESS
EOSINOPHIL NFR FLD MANUAL: 2 %
GBM AB SER IA-ACNC: 3 UNITS (ref 0–20)
LACEYELLA SACCHARI AB SER QL: NEGATIVE
LYMPHOCYTES NFR BLD AUTO: 27 %
MONO+MESO NFR FLD MANUAL: 9 %
MONONUC CELLS NFR FLD MANUAL: 32 %
MYELOPEROXIDASE AB SER IA-ACNC: <9 U/ML (ref 0–9)
NEUTS SEG NFR BLD AUTO: 30 %
P-ANCA ATYPICAL TITR SER IF: NORMAL TITER
P-ANCA TITR SER IF: NORMAL TITER
PIGEON SERUM AB QL ID: NEGATIVE
PROTEINASE3 AB SER IA-ACNC: <3.5 U/ML (ref 0–3.5)
S RECTIVIRGULA AB SER QL ID: POSITIVE
SITE: ABNORMAL
T VULGARIS AB SER QL ID: NEGATIVE
TOTAL CELLS COUNTED SPEC: 100
WBC # FLD MANUAL: 10 /UL

## 2017-03-20 PROCEDURE — 94640 AIRWAY INHALATION TREATMENT: CPT

## 2017-03-20 PROCEDURE — 87070 CULTURE OTHR SPECIMN AEROBIC: CPT | Performed by: INTERNAL MEDICINE

## 2017-03-20 PROCEDURE — 88184 FLOWCYTOMETRY/ TC 1 MARKER: CPT | Performed by: INTERNAL MEDICINE

## 2017-03-20 PROCEDURE — 88305 TISSUE EXAM BY PATHOLOGIST: CPT | Performed by: INTERNAL MEDICINE

## 2017-03-20 PROCEDURE — 87015 SPECIMEN INFECT AGNT CONCNTJ: CPT | Performed by: INTERNAL MEDICINE

## 2017-03-20 PROCEDURE — 0B998ZX DRAINAGE OF LINGULA BRONCHUS, VIA NATURAL OR ARTIFICIAL OPENING ENDOSCOPIC, DIAGNOSTIC: ICD-10-PCS | Performed by: INTERNAL MEDICINE

## 2017-03-20 PROCEDURE — 89051 BODY FLUID CELL COUNT: CPT | Performed by: INTERNAL MEDICINE

## 2017-03-20 PROCEDURE — 94760 N-INVAS EAR/PLS OXIMETRY 1: CPT

## 2017-03-20 PROCEDURE — 87102 FUNGUS ISOLATION CULTURE: CPT | Performed by: INTERNAL MEDICINE

## 2017-03-20 PROCEDURE — 87206 SMEAR FLUORESCENT/ACID STAI: CPT | Performed by: INTERNAL MEDICINE

## 2017-03-20 PROCEDURE — 87252 VIRUS INOCULATION TISSUE: CPT | Performed by: INTERNAL MEDICINE

## 2017-03-20 PROCEDURE — 87116 MYCOBACTERIA CULTURE: CPT | Performed by: INTERNAL MEDICINE

## 2017-03-20 PROCEDURE — 88185 FLOWCYTOMETRY/TC ADD-ON: CPT | Performed by: INTERNAL MEDICINE

## 2017-03-20 PROCEDURE — 0B988ZX DRAINAGE OF LEFT UPPER LOBE BRONCHUS, VIA NATURAL OR ARTIFICIAL OPENING ENDOSCOPIC, DIAGNOSTIC: ICD-10-PCS | Performed by: INTERNAL MEDICINE

## 2017-03-20 PROCEDURE — 97530 THERAPEUTIC ACTIVITIES: CPT

## 2017-03-20 PROCEDURE — 88112 CYTOPATH CELL ENHANCE TECH: CPT | Performed by: INTERNAL MEDICINE

## 2017-03-20 RX ORDER — SODIUM CHLORIDE 9 MG/ML
INJECTION, SOLUTION INTRAVENOUS CONTINUOUS PRN
Status: DISCONTINUED | OUTPATIENT
Start: 2017-03-20 | End: 2017-03-20 | Stop reason: SURG

## 2017-03-20 RX ORDER — SPIRONOLACTONE 25 MG/1
50 TABLET ORAL DAILY
Status: DISCONTINUED | OUTPATIENT
Start: 2017-03-20 | End: 2017-03-22

## 2017-03-20 RX ORDER — FUROSEMIDE 80 MG
80 TABLET ORAL DAILY
Status: DISCONTINUED | OUTPATIENT
Start: 2017-03-20 | End: 2017-03-21

## 2017-03-20 RX ORDER — BUDESONIDE AND FORMOTEROL FUMARATE DIHYDRATE 160; 4.5 UG/1; UG/1
2 AEROSOL RESPIRATORY (INHALATION)
Status: DISCONTINUED | OUTPATIENT
Start: 2017-03-20 | End: 2017-03-24 | Stop reason: HOSPADM

## 2017-03-20 RX ORDER — PROPOFOL 10 MG/ML
INJECTION, EMULSION INTRAVENOUS AS NEEDED
Status: DISCONTINUED | OUTPATIENT
Start: 2017-03-20 | End: 2017-03-20 | Stop reason: SURG

## 2017-03-20 RX ORDER — POTASSIUM CHLORIDE 20 MEQ/1
40 TABLET, EXTENDED RELEASE ORAL 3 TIMES DAILY
Status: DISCONTINUED | OUTPATIENT
Start: 2017-03-20 | End: 2017-03-21

## 2017-03-20 RX ORDER — ISOSORBIDE MONONITRATE 60 MG/1
60 TABLET, EXTENDED RELEASE ORAL DAILY
Status: DISCONTINUED | OUTPATIENT
Start: 2017-03-20 | End: 2017-03-22

## 2017-03-20 RX ORDER — OXYCODONE HYDROCHLORIDE AND ACETAMINOPHEN 5; 325 MG/1; MG/1
1 TABLET ORAL EVERY 6 HOURS PRN
Status: DISCONTINUED | OUTPATIENT
Start: 2017-03-20 | End: 2017-03-24 | Stop reason: HOSPADM

## 2017-03-20 RX ORDER — GABAPENTIN 100 MG/1
100 CAPSULE ORAL 2 TIMES DAILY
Status: DISCONTINUED | OUTPATIENT
Start: 2017-03-20 | End: 2017-03-24 | Stop reason: HOSPADM

## 2017-03-20 RX ADMIN — LEVOTHYROXINE SODIUM 150 MCG: 75 TABLET ORAL at 05:27

## 2017-03-20 RX ADMIN — ENOXAPARIN SODIUM 40 MG: 40 INJECTION SUBCUTANEOUS at 10:33

## 2017-03-20 RX ADMIN — OXYCODONE HYDROCHLORIDE AND ACETAMINOPHEN 1 TABLET: 5; 325 TABLET ORAL at 14:25

## 2017-03-20 RX ADMIN — POTASSIUM CHLORIDE 40 MEQ: 1500 TABLET, EXTENDED RELEASE ORAL at 10:31

## 2017-03-20 RX ADMIN — HYDROXYZINE HYDROCHLORIDE 25 MG: 25 TABLET, FILM COATED ORAL at 12:18

## 2017-03-20 RX ADMIN — LORAZEPAM 0.5 MG: 0.5 TABLET ORAL at 12:15

## 2017-03-20 RX ADMIN — ISOSORBIDE MONONITRATE 60 MG: 60 TABLET, EXTENDED RELEASE ORAL at 10:31

## 2017-03-20 RX ADMIN — SODIUM CHLORIDE 250 ML: 0.9 INJECTION, SOLUTION INTRAVENOUS at 23:36

## 2017-03-20 RX ADMIN — POTASSIUM CHLORIDE 40 MEQ: 1500 TABLET, EXTENDED RELEASE ORAL at 21:30

## 2017-03-20 RX ADMIN — POTASSIUM CHLORIDE 40 MEQ: 1500 TABLET, EXTENDED RELEASE ORAL at 15:48

## 2017-03-20 RX ADMIN — TRAZODONE HYDROCHLORIDE 50 MG: 50 TABLET ORAL at 22:42

## 2017-03-20 RX ADMIN — DOCUSATE SODIUM 100 MG: 100 CAPSULE, LIQUID FILLED ORAL at 14:25

## 2017-03-20 RX ADMIN — PROPOFOL 30 MG: 10 INJECTION, EMULSION INTRAVENOUS at 07:30

## 2017-03-20 RX ADMIN — BUDESONIDE AND FORMOTEROL FUMARATE DIHYDRATE 2 PUFF: 160; 4.5 AEROSOL RESPIRATORY (INHALATION) at 19:26

## 2017-03-20 RX ADMIN — SODIUM CHLORIDE: 0.9 INJECTION, SOLUTION INTRAVENOUS at 07:13

## 2017-03-20 RX ADMIN — PRAVASTATIN SODIUM 40 MG: 40 TABLET ORAL at 15:48

## 2017-03-20 RX ADMIN — METOPROLOL TARTRATE 12.5 MG: 25 TABLET ORAL at 10:32

## 2017-03-20 RX ADMIN — SPIRONOLACTONE 50 MG: 25 TABLET, FILM COATED ORAL at 10:32

## 2017-03-20 RX ADMIN — PROPOFOL 50 MG: 10 INJECTION, EMULSION INTRAVENOUS at 07:25

## 2017-03-20 RX ADMIN — SERTRALINE HYDROCHLORIDE 50 MG: 50 TABLET ORAL at 10:32

## 2017-03-20 RX ADMIN — BUDESONIDE AND FORMOTEROL FUMARATE DIHYDRATE 2 PUFF: 160; 4.5 AEROSOL RESPIRATORY (INHALATION) at 10:58

## 2017-03-20 RX ADMIN — PROPOFOL 30 MG: 10 INJECTION, EMULSION INTRAVENOUS at 07:37

## 2017-03-20 RX ADMIN — TIOTROPIUM BROMIDE 18 MCG: 18 CAPSULE ORAL; RESPIRATORY (INHALATION) at 10:58

## 2017-03-20 RX ADMIN — DOCUSATE SODIUM 100 MG: 100 CAPSULE, LIQUID FILLED ORAL at 22:42

## 2017-03-20 RX ADMIN — GABAPENTIN 100 MG: 100 CAPSULE ORAL at 10:31

## 2017-03-20 RX ADMIN — DOCUSATE SODIUM 100 MG: 100 CAPSULE, LIQUID FILLED ORAL at 05:27

## 2017-03-20 RX ADMIN — GABAPENTIN 100 MG: 100 CAPSULE ORAL at 19:24

## 2017-03-20 RX ADMIN — FUROSEMIDE 80 MG: 80 TABLET ORAL at 10:31

## 2017-03-20 RX ADMIN — TRAMADOL HYDROCHLORIDE 50 MG: 50 TABLET, COATED ORAL at 11:42

## 2017-03-21 ENCOUNTER — APPOINTMENT (INPATIENT)
Dept: PHYSICAL THERAPY | Facility: HOSPITAL | Age: 82
DRG: 291 | End: 2017-03-21
Payer: MEDICARE

## 2017-03-21 LAB
ANION GAP SERPL CALCULATED.3IONS-SCNC: 6 MMOL/L (ref 4–13)
BASOPHILS # BLD MANUAL: 0 THOUSAND/UL (ref 0–0.1)
BASOPHILS NFR MAR MANUAL: 0 % (ref 0–1)
BUN SERPL-MCNC: 26 MG/DL (ref 5–25)
CALCIUM SERPL-MCNC: 8.3 MG/DL (ref 8.3–10.1)
CHLORIDE SERPL-SCNC: 102 MMOL/L (ref 100–108)
CO2 SERPL-SCNC: 31 MMOL/L (ref 21–32)
CREAT SERPL-MCNC: 1.06 MG/DL (ref 0.6–1.3)
EOSINOPHIL # BLD MANUAL: 0.06 THOUSAND/UL (ref 0–0.4)
EOSINOPHIL NFR BLD MANUAL: 1 % (ref 0–6)
ERYTHROCYTE [DISTWIDTH] IN BLOOD BY AUTOMATED COUNT: 16.7 % (ref 11.6–15.1)
EST. AVERAGE GLUCOSE BLD GHB EST-MCNC: 108 MG/DL
GFR SERPL CREATININE-BSD FRML MDRD: 49.6 ML/MIN/1.73SQ M
GLUCOSE SERPL-MCNC: 102 MG/DL (ref 65–140)
HBA1C MFR BLD: 5.4 % (ref 4.2–6.3)
HCT VFR BLD AUTO: 40 % (ref 34.8–46.1)
HGB BLD-MCNC: 12.2 G/DL (ref 11.5–15.4)
LYMPHOCYTES # BLD AUTO: 1.05 THOUSAND/UL (ref 0.6–4.47)
LYMPHOCYTES # BLD AUTO: 19 % (ref 14–44)
MAGNESIUM SERPL-MCNC: 2.2 MG/DL (ref 1.6–2.6)
MCH RBC QN AUTO: 30.2 PG (ref 26.8–34.3)
MCHC RBC AUTO-ENTMCNC: 30.5 G/DL (ref 31.4–37.4)
MCV RBC AUTO: 99 FL (ref 82–98)
MONOCYTES # BLD AUTO: 0.61 THOUSAND/UL (ref 0–1.22)
MONOCYTES NFR BLD: 11 % (ref 4–12)
NEUTROPHILS # BLD MANUAL: 3.81 THOUSAND/UL (ref 1.85–7.62)
NEUTS SEG NFR BLD AUTO: 69 % (ref 43–75)
P JIROVECII AG SPEC QL IF: NORMAL
PLATELET # BLD AUTO: 238 THOUSANDS/UL (ref 149–390)
PLATELET BLD QL SMEAR: ADEQUATE
PMV BLD AUTO: 9.2 FL (ref 8.9–12.7)
POTASSIUM SERPL-SCNC: 4.2 MMOL/L (ref 3.5–5.3)
RBC # BLD AUTO: 4.04 MILLION/UL (ref 3.81–5.12)
RBC MORPH BLD: NORMAL
SODIUM SERPL-SCNC: 139 MMOL/L (ref 136–145)
TOTAL CELLS COUNTED SPEC: 100
WBC # BLD AUTO: 5.52 THOUSAND/UL (ref 4.31–10.16)

## 2017-03-21 PROCEDURE — 94760 N-INVAS EAR/PLS OXIMETRY 1: CPT

## 2017-03-21 PROCEDURE — 94640 AIRWAY INHALATION TREATMENT: CPT

## 2017-03-21 PROCEDURE — 80048 BASIC METABOLIC PNL TOTAL CA: CPT | Performed by: INTERNAL MEDICINE

## 2017-03-21 PROCEDURE — 85007 BL SMEAR W/DIFF WBC COUNT: CPT | Performed by: INTERNAL MEDICINE

## 2017-03-21 PROCEDURE — 83735 ASSAY OF MAGNESIUM: CPT | Performed by: INTERNAL MEDICINE

## 2017-03-21 PROCEDURE — 85027 COMPLETE CBC AUTOMATED: CPT | Performed by: INTERNAL MEDICINE

## 2017-03-21 PROCEDURE — 97530 THERAPEUTIC ACTIVITIES: CPT

## 2017-03-21 PROCEDURE — 83036 HEMOGLOBIN GLYCOSYLATED A1C: CPT | Performed by: INTERNAL MEDICINE

## 2017-03-21 RX ORDER — FUROSEMIDE 40 MG/1
40 TABLET ORAL DAILY
Status: DISCONTINUED | OUTPATIENT
Start: 2017-03-21 | End: 2017-03-24 | Stop reason: HOSPADM

## 2017-03-21 RX ORDER — POTASSIUM CHLORIDE 20 MEQ/1
40 TABLET, EXTENDED RELEASE ORAL 2 TIMES DAILY WITH MEALS
Status: DISCONTINUED | OUTPATIENT
Start: 2017-03-22 | End: 2017-03-23

## 2017-03-21 RX ADMIN — DOCUSATE SODIUM 100 MG: 100 CAPSULE, LIQUID FILLED ORAL at 13:14

## 2017-03-21 RX ADMIN — POTASSIUM CHLORIDE 40 MEQ: 1500 TABLET, EXTENDED RELEASE ORAL at 08:11

## 2017-03-21 RX ADMIN — DOCUSATE SODIUM 100 MG: 100 CAPSULE, LIQUID FILLED ORAL at 05:55

## 2017-03-21 RX ADMIN — DOCUSATE SODIUM 100 MG: 100 CAPSULE, LIQUID FILLED ORAL at 21:06

## 2017-03-21 RX ADMIN — TRAZODONE HYDROCHLORIDE 50 MG: 50 TABLET ORAL at 21:06

## 2017-03-21 RX ADMIN — SERTRALINE HYDROCHLORIDE 50 MG: 50 TABLET ORAL at 08:11

## 2017-03-21 RX ADMIN — BUDESONIDE AND FORMOTEROL FUMARATE DIHYDRATE 2 PUFF: 160; 4.5 AEROSOL RESPIRATORY (INHALATION) at 08:49

## 2017-03-21 RX ADMIN — ENOXAPARIN SODIUM 40 MG: 40 INJECTION SUBCUTANEOUS at 08:11

## 2017-03-21 RX ADMIN — HYDROXYZINE HYDROCHLORIDE 25 MG: 25 TABLET, FILM COATED ORAL at 17:27

## 2017-03-21 RX ADMIN — SODIUM CHLORIDE 250 ML: 0.9 INJECTION, SOLUTION INTRAVENOUS at 05:53

## 2017-03-21 RX ADMIN — SPIRONOLACTONE 50 MG: 25 TABLET, FILM COATED ORAL at 08:11

## 2017-03-21 RX ADMIN — HYDROXYZINE HYDROCHLORIDE 25 MG: 25 TABLET, FILM COATED ORAL at 08:11

## 2017-03-21 RX ADMIN — LORAZEPAM 0.5 MG: 0.5 TABLET ORAL at 18:30

## 2017-03-21 RX ADMIN — TRAMADOL HYDROCHLORIDE 50 MG: 50 TABLET, COATED ORAL at 21:23

## 2017-03-21 RX ADMIN — GABAPENTIN 100 MG: 100 CAPSULE ORAL at 08:11

## 2017-03-21 RX ADMIN — LEVOTHYROXINE SODIUM 150 MCG: 75 TABLET ORAL at 05:55

## 2017-03-21 RX ADMIN — FUROSEMIDE 40 MG: 40 TABLET ORAL at 08:11

## 2017-03-21 RX ADMIN — BUDESONIDE AND FORMOTEROL FUMARATE DIHYDRATE 2 PUFF: 160; 4.5 AEROSOL RESPIRATORY (INHALATION) at 19:23

## 2017-03-22 ENCOUNTER — APPOINTMENT (INPATIENT)
Dept: PHYSICAL THERAPY | Facility: HOSPITAL | Age: 82
DRG: 291 | End: 2017-03-22
Payer: MEDICARE

## 2017-03-22 ENCOUNTER — APPOINTMENT (INPATIENT)
Dept: RADIOLOGY | Facility: HOSPITAL | Age: 82
DRG: 291 | End: 2017-03-22
Payer: MEDICARE

## 2017-03-22 LAB
ANION GAP SERPL CALCULATED.3IONS-SCNC: 6 MMOL/L (ref 4–13)
BACTERIA BRONCH AEROBE CULT: NORMAL
BASOPHILS # BLD AUTO: 0.02 THOUSANDS/ΜL (ref 0–0.1)
BASOPHILS NFR BLD AUTO: 0 % (ref 0–1)
BUN SERPL-MCNC: 20 MG/DL (ref 5–25)
CALCIUM SERPL-MCNC: 8.4 MG/DL (ref 8.3–10.1)
CHLORIDE SERPL-SCNC: 103 MMOL/L (ref 100–108)
CO2 SERPL-SCNC: 30 MMOL/L (ref 21–32)
CREAT SERPL-MCNC: 0.9 MG/DL (ref 0.6–1.3)
EOSINOPHIL # BLD AUTO: 0.14 THOUSAND/ΜL (ref 0–0.61)
EOSINOPHIL NFR BLD AUTO: 2 % (ref 0–6)
ERYTHROCYTE [DISTWIDTH] IN BLOOD BY AUTOMATED COUNT: 16.6 % (ref 11.6–15.1)
GFR SERPL CREATININE-BSD FRML MDRD: 59.9 ML/MIN/1.73SQ M
GLUCOSE SERPL-MCNC: 97 MG/DL (ref 65–140)
GRAM STN SPEC: NORMAL
HCT VFR BLD AUTO: 40.4 % (ref 34.8–46.1)
HGB BLD-MCNC: 12.5 G/DL (ref 11.5–15.4)
LYMPHOCYTES # BLD AUTO: 1.05 THOUSANDS/ΜL (ref 0.6–4.47)
LYMPHOCYTES NFR BLD AUTO: 18 % (ref 14–44)
MCH RBC QN AUTO: 30.5 PG (ref 26.8–34.3)
MCHC RBC AUTO-ENTMCNC: 30.9 G/DL (ref 31.4–37.4)
MCV RBC AUTO: 99 FL (ref 82–98)
MONOCYTES # BLD AUTO: 0.59 THOUSAND/ΜL (ref 0.17–1.22)
MONOCYTES NFR BLD AUTO: 10 % (ref 4–12)
NEUTROPHILS # BLD AUTO: 4.15 THOUSANDS/ΜL (ref 1.85–7.62)
NEUTS SEG NFR BLD AUTO: 70 % (ref 43–75)
PLATELET # BLD AUTO: 232 THOUSANDS/UL (ref 149–390)
PMV BLD AUTO: 9.1 FL (ref 8.9–12.7)
POTASSIUM SERPL-SCNC: 3.6 MMOL/L (ref 3.5–5.3)
RBC # BLD AUTO: 4.1 MILLION/UL (ref 3.81–5.12)
SODIUM SERPL-SCNC: 139 MMOL/L (ref 136–145)
WBC # BLD AUTO: 5.95 THOUSAND/UL (ref 4.31–10.16)

## 2017-03-22 PROCEDURE — 85025 COMPLETE CBC W/AUTO DIFF WBC: CPT | Performed by: INTERNAL MEDICINE

## 2017-03-22 PROCEDURE — 80048 BASIC METABOLIC PNL TOTAL CA: CPT | Performed by: INTERNAL MEDICINE

## 2017-03-22 PROCEDURE — 71010 HB CHEST X-RAY 1 VIEW FRONTAL (PORTABLE): CPT

## 2017-03-22 PROCEDURE — 94640 AIRWAY INHALATION TREATMENT: CPT

## 2017-03-22 PROCEDURE — 94760 N-INVAS EAR/PLS OXIMETRY 1: CPT

## 2017-03-22 PROCEDURE — 97530 THERAPEUTIC ACTIVITIES: CPT

## 2017-03-22 PROCEDURE — 97110 THERAPEUTIC EXERCISES: CPT

## 2017-03-22 RX ORDER — ISOSORBIDE MONONITRATE 30 MG/1
30 TABLET, EXTENDED RELEASE ORAL DAILY
Status: DISCONTINUED | OUTPATIENT
Start: 2017-03-23 | End: 2017-03-24 | Stop reason: HOSPADM

## 2017-03-22 RX ORDER — SPIRONOLACTONE 25 MG/1
25 TABLET ORAL DAILY
Status: DISCONTINUED | OUTPATIENT
Start: 2017-03-23 | End: 2017-03-24 | Stop reason: HOSPADM

## 2017-03-22 RX ADMIN — BUDESONIDE AND FORMOTEROL FUMARATE DIHYDRATE 2 PUFF: 160; 4.5 AEROSOL RESPIRATORY (INHALATION) at 08:45

## 2017-03-22 RX ADMIN — BUDESONIDE AND FORMOTEROL FUMARATE DIHYDRATE 2 PUFF: 160; 4.5 AEROSOL RESPIRATORY (INHALATION) at 20:19

## 2017-03-22 RX ADMIN — METOPROLOL TARTRATE 12.5 MG: 25 TABLET ORAL at 10:02

## 2017-03-22 RX ADMIN — TIOTROPIUM BROMIDE 18 MCG: 18 CAPSULE ORAL; RESPIRATORY (INHALATION) at 08:45

## 2017-03-22 RX ADMIN — LEVOTHYROXINE SODIUM 150 MCG: 75 TABLET ORAL at 05:09

## 2017-03-22 RX ADMIN — ACETAMINOPHEN 650 MG: 325 TABLET, FILM COATED ORAL at 15:48

## 2017-03-22 RX ADMIN — LORAZEPAM 0.5 MG: 0.5 TABLET ORAL at 10:04

## 2017-03-22 RX ADMIN — POTASSIUM CHLORIDE 40 MEQ: 1500 TABLET, EXTENDED RELEASE ORAL at 18:23

## 2017-03-22 RX ADMIN — ISOSORBIDE MONONITRATE 60 MG: 60 TABLET, EXTENDED RELEASE ORAL at 10:05

## 2017-03-22 RX ADMIN — DOCUSATE SODIUM 100 MG: 100 CAPSULE, LIQUID FILLED ORAL at 05:09

## 2017-03-22 RX ADMIN — PRAVASTATIN SODIUM 40 MG: 40 TABLET ORAL at 18:23

## 2017-03-22 RX ADMIN — POTASSIUM CHLORIDE 40 MEQ: 1500 TABLET, EXTENDED RELEASE ORAL at 10:02

## 2017-03-22 RX ADMIN — FUROSEMIDE 40 MG: 40 TABLET ORAL at 10:03

## 2017-03-22 RX ADMIN — OXYCODONE HYDROCHLORIDE AND ACETAMINOPHEN 1 TABLET: 5; 325 TABLET ORAL at 10:12

## 2017-03-22 RX ADMIN — SERTRALINE HYDROCHLORIDE 50 MG: 50 TABLET ORAL at 10:02

## 2017-03-22 RX ADMIN — GABAPENTIN 100 MG: 100 CAPSULE ORAL at 18:23

## 2017-03-22 RX ADMIN — ENOXAPARIN SODIUM 40 MG: 40 INJECTION SUBCUTANEOUS at 10:03

## 2017-03-22 RX ADMIN — DOCUSATE SODIUM 100 MG: 100 CAPSULE, LIQUID FILLED ORAL at 21:39

## 2017-03-22 RX ADMIN — SPIRONOLACTONE 50 MG: 25 TABLET, FILM COATED ORAL at 10:04

## 2017-03-22 RX ADMIN — LORAZEPAM 0.5 MG: 0.5 TABLET ORAL at 15:48

## 2017-03-22 RX ADMIN — DOCUSATE SODIUM 100 MG: 100 CAPSULE, LIQUID FILLED ORAL at 15:48

## 2017-03-22 RX ADMIN — HYDROXYZINE HYDROCHLORIDE 25 MG: 25 TABLET, FILM COATED ORAL at 10:03

## 2017-03-22 RX ADMIN — TRAMADOL HYDROCHLORIDE 50 MG: 50 TABLET, COATED ORAL at 15:48

## 2017-03-22 RX ADMIN — TRAZODONE HYDROCHLORIDE 50 MG: 50 TABLET ORAL at 21:39

## 2017-03-22 RX ADMIN — GABAPENTIN 100 MG: 100 CAPSULE ORAL at 10:03

## 2017-03-23 ENCOUNTER — APPOINTMENT (INPATIENT)
Dept: OCCUPATIONAL THERAPY | Facility: HOSPITAL | Age: 82
DRG: 291 | End: 2017-03-23
Payer: MEDICARE

## 2017-03-23 ENCOUNTER — APPOINTMENT (INPATIENT)
Dept: RADIOLOGY | Facility: HOSPITAL | Age: 82
DRG: 291 | End: 2017-03-23
Payer: MEDICARE

## 2017-03-23 LAB
ANION GAP SERPL CALCULATED.3IONS-SCNC: 6 MMOL/L (ref 4–13)
BASOPHILS # BLD AUTO: 0.03 THOUSANDS/ΜL (ref 0–0.1)
BASOPHILS NFR BLD AUTO: 1 % (ref 0–1)
BUN SERPL-MCNC: 20 MG/DL (ref 5–25)
CALCIUM SERPL-MCNC: 8.6 MG/DL (ref 8.3–10.1)
CHLORIDE SERPL-SCNC: 103 MMOL/L (ref 100–108)
CO2 SERPL-SCNC: 31 MMOL/L (ref 21–32)
CREAT SERPL-MCNC: 0.96 MG/DL (ref 0.6–1.3)
EOSINOPHIL # BLD AUTO: 0.17 THOUSAND/ΜL (ref 0–0.61)
EOSINOPHIL NFR BLD AUTO: 3 % (ref 0–6)
ERYTHROCYTE [DISTWIDTH] IN BLOOD BY AUTOMATED COUNT: 16.7 % (ref 11.6–15.1)
GFR SERPL CREATININE-BSD FRML MDRD: 55.6 ML/MIN/1.73SQ M
GLUCOSE SERPL-MCNC: 94 MG/DL (ref 65–140)
HCT VFR BLD AUTO: 42.3 % (ref 34.8–46.1)
HGB BLD-MCNC: 12.8 G/DL (ref 11.5–15.4)
LYMPHOCYTES # BLD AUTO: 1.01 THOUSANDS/ΜL (ref 0.6–4.47)
LYMPHOCYTES NFR BLD AUTO: 16 % (ref 14–44)
MCH RBC QN AUTO: 29.8 PG (ref 26.8–34.3)
MCHC RBC AUTO-ENTMCNC: 30.3 G/DL (ref 31.4–37.4)
MCV RBC AUTO: 98 FL (ref 82–98)
MONOCYTES # BLD AUTO: 0.63 THOUSAND/ΜL (ref 0.17–1.22)
MONOCYTES NFR BLD AUTO: 10 % (ref 4–12)
NEUTROPHILS # BLD AUTO: 4.44 THOUSANDS/ΜL (ref 1.85–7.62)
NEUTS SEG NFR BLD AUTO: 70 % (ref 43–75)
PLATELET # BLD AUTO: 215 THOUSANDS/UL (ref 149–390)
PMV BLD AUTO: 8.9 FL (ref 8.9–12.7)
POTASSIUM SERPL-SCNC: 4.1 MMOL/L (ref 3.5–5.3)
RBC # BLD AUTO: 4.3 MILLION/UL (ref 3.81–5.12)
SCAN RESULT: NORMAL
SODIUM SERPL-SCNC: 140 MMOL/L (ref 136–145)
WBC # BLD AUTO: 6.28 THOUSAND/UL (ref 4.31–10.16)

## 2017-03-23 PROCEDURE — 94760 N-INVAS EAR/PLS OXIMETRY 1: CPT

## 2017-03-23 PROCEDURE — 94640 AIRWAY INHALATION TREATMENT: CPT

## 2017-03-23 PROCEDURE — 97535 SELF CARE MNGMENT TRAINING: CPT

## 2017-03-23 PROCEDURE — 80048 BASIC METABOLIC PNL TOTAL CA: CPT | Performed by: INTERNAL MEDICINE

## 2017-03-23 PROCEDURE — 71010 HB CHEST X-RAY 1 VIEW FRONTAL (PORTABLE): CPT

## 2017-03-23 PROCEDURE — 85025 COMPLETE CBC W/AUTO DIFF WBC: CPT | Performed by: INTERNAL MEDICINE

## 2017-03-23 RX ORDER — PREDNISONE 20 MG/1
40 TABLET ORAL DAILY
Status: DISCONTINUED | OUTPATIENT
Start: 2017-03-23 | End: 2017-03-24 | Stop reason: HOSPADM

## 2017-03-23 RX ORDER — POTASSIUM CHLORIDE 20 MEQ/1
20 TABLET, EXTENDED RELEASE ORAL 2 TIMES DAILY WITH MEALS
Status: DISCONTINUED | OUTPATIENT
Start: 2017-03-23 | End: 2017-03-24 | Stop reason: HOSPADM

## 2017-03-23 RX ORDER — BISACODYL 10 MG
10 SUPPOSITORY, RECTAL RECTAL ONCE
Status: COMPLETED | OUTPATIENT
Start: 2017-03-23 | End: 2017-03-23

## 2017-03-23 RX ADMIN — SPIRONOLACTONE 25 MG: 25 TABLET, FILM COATED ORAL at 10:15

## 2017-03-23 RX ADMIN — POTASSIUM CHLORIDE 40 MEQ: 1500 TABLET, EXTENDED RELEASE ORAL at 10:13

## 2017-03-23 RX ADMIN — DOCUSATE SODIUM 100 MG: 100 CAPSULE, LIQUID FILLED ORAL at 06:45

## 2017-03-23 RX ADMIN — METOPROLOL TARTRATE 12.5 MG: 25 TABLET ORAL at 10:15

## 2017-03-23 RX ADMIN — POTASSIUM CHLORIDE 20 MEQ: 1500 TABLET, EXTENDED RELEASE ORAL at 17:22

## 2017-03-23 RX ADMIN — TEMAZEPAM 15 MG: 15 CAPSULE ORAL at 00:20

## 2017-03-23 RX ADMIN — GABAPENTIN 100 MG: 100 CAPSULE ORAL at 17:22

## 2017-03-23 RX ADMIN — ISOSORBIDE MONONITRATE 30 MG: 30 TABLET, EXTENDED RELEASE ORAL at 10:15

## 2017-03-23 RX ADMIN — LORAZEPAM 0.5 MG: 0.5 TABLET ORAL at 12:27

## 2017-03-23 RX ADMIN — ENOXAPARIN SODIUM 40 MG: 40 INJECTION SUBCUTANEOUS at 10:14

## 2017-03-23 RX ADMIN — METOPROLOL TARTRATE 12.5 MG: 25 TABLET ORAL at 21:49

## 2017-03-23 RX ADMIN — HYDROXYZINE HYDROCHLORIDE 25 MG: 25 TABLET, FILM COATED ORAL at 12:26

## 2017-03-23 RX ADMIN — FUROSEMIDE 40 MG: 40 TABLET ORAL at 10:15

## 2017-03-23 RX ADMIN — LEVOTHYROXINE SODIUM 150 MCG: 75 TABLET ORAL at 06:45

## 2017-03-23 RX ADMIN — BUDESONIDE AND FORMOTEROL FUMARATE DIHYDRATE 2 PUFF: 160; 4.5 AEROSOL RESPIRATORY (INHALATION) at 20:28

## 2017-03-23 RX ADMIN — TEMAZEPAM 15 MG: 15 CAPSULE ORAL at 21:42

## 2017-03-23 RX ADMIN — PRAVASTATIN SODIUM 40 MG: 40 TABLET ORAL at 17:22

## 2017-03-23 RX ADMIN — RIVAROXABAN 15 MG: 15 TABLET, FILM COATED ORAL at 17:22

## 2017-03-23 RX ADMIN — DOCUSATE SODIUM 100 MG: 100 CAPSULE, LIQUID FILLED ORAL at 21:43

## 2017-03-23 RX ADMIN — BUDESONIDE AND FORMOTEROL FUMARATE DIHYDRATE 2 PUFF: 160; 4.5 AEROSOL RESPIRATORY (INHALATION) at 07:43

## 2017-03-23 RX ADMIN — LORAZEPAM 0.5 MG: 0.5 TABLET ORAL at 12:29

## 2017-03-23 RX ADMIN — TRAZODONE HYDROCHLORIDE 50 MG: 50 TABLET ORAL at 21:43

## 2017-03-23 RX ADMIN — GABAPENTIN 100 MG: 100 CAPSULE ORAL at 10:14

## 2017-03-23 RX ADMIN — SERTRALINE HYDROCHLORIDE 50 MG: 50 TABLET ORAL at 10:15

## 2017-03-23 RX ADMIN — PREDNISONE 40 MG: 20 TABLET ORAL at 17:22

## 2017-03-23 RX ADMIN — TRAMADOL HYDROCHLORIDE 50 MG: 50 TABLET, COATED ORAL at 12:27

## 2017-03-23 RX ADMIN — BISACODYL 10 MG: 10 SUPPOSITORY RECTAL at 16:20

## 2017-03-23 RX ADMIN — TIOTROPIUM BROMIDE 18 MCG: 18 CAPSULE ORAL; RESPIRATORY (INHALATION) at 07:43

## 2017-03-24 ENCOUNTER — APPOINTMENT (INPATIENT)
Dept: PHYSICAL THERAPY | Facility: HOSPITAL | Age: 82
DRG: 291 | End: 2017-03-24
Payer: MEDICARE

## 2017-03-24 VITALS
OXYGEN SATURATION: 92 % | HEART RATE: 68 BPM | HEIGHT: 68 IN | TEMPERATURE: 98.6 F | RESPIRATION RATE: 18 BRPM | BODY MASS INDEX: 40.4 KG/M2 | WEIGHT: 266.54 LBS | SYSTOLIC BLOOD PRESSURE: 103 MMHG | DIASTOLIC BLOOD PRESSURE: 56 MMHG

## 2017-03-24 PROBLEM — I50.33 ACUTE ON CHRONIC DIASTOLIC (CONGESTIVE) HEART FAILURE (HCC): Status: RESOLVED | Noted: 2017-03-14 | Resolved: 2017-03-24

## 2017-03-24 PROBLEM — J84.9 INTERSTITIAL LUNG DISEASE (HCC): Status: RESOLVED | Noted: 2017-03-18 | Resolved: 2017-03-24

## 2017-03-24 LAB
ANION GAP SERPL CALCULATED.3IONS-SCNC: 6 MMOL/L (ref 4–13)
BUN SERPL-MCNC: 19 MG/DL (ref 5–25)
CALCIUM SERPL-MCNC: 8.7 MG/DL (ref 8.3–10.1)
CHLORIDE SERPL-SCNC: 104 MMOL/L (ref 100–108)
CO2 SERPL-SCNC: 29 MMOL/L (ref 21–32)
CREAT SERPL-MCNC: 0.96 MG/DL (ref 0.6–1.3)
GFR SERPL CREATININE-BSD FRML MDRD: 55.6 ML/MIN/1.73SQ M
GLUCOSE SERPL-MCNC: 133 MG/DL (ref 65–140)
POTASSIUM SERPL-SCNC: 4.4 MMOL/L (ref 3.5–5.3)
SODIUM SERPL-SCNC: 139 MMOL/L (ref 136–145)

## 2017-03-24 PROCEDURE — 94640 AIRWAY INHALATION TREATMENT: CPT

## 2017-03-24 PROCEDURE — 94760 N-INVAS EAR/PLS OXIMETRY 1: CPT

## 2017-03-24 PROCEDURE — 80048 BASIC METABOLIC PNL TOTAL CA: CPT | Performed by: INTERNAL MEDICINE

## 2017-03-24 RX ORDER — LEVOTHYROXINE SODIUM 0.15 MG/1
150 TABLET ORAL
Qty: 30 TABLET | Refills: 0 | Status: SHIPPED | OUTPATIENT
Start: 2017-03-24 | End: 2020-01-13

## 2017-03-24 RX ORDER — FUROSEMIDE 40 MG/1
40 TABLET ORAL DAILY
Qty: 30 TABLET | Refills: 0 | Status: SHIPPED | OUTPATIENT
Start: 2017-03-24 | End: 2017-04-23

## 2017-03-24 RX ORDER — ISOSORBIDE MONONITRATE 30 MG/1
30 TABLET, EXTENDED RELEASE ORAL DAILY
Qty: 30 TABLET | Refills: 0 | Status: SHIPPED | OUTPATIENT
Start: 2017-03-24 | End: 2020-01-13

## 2017-03-24 RX ORDER — PREDNISONE 10 MG/1
TABLET ORAL
Qty: 70 TABLET | Refills: 0 | Status: SHIPPED | OUTPATIENT
Start: 2017-03-24

## 2017-03-24 RX ORDER — BUDESONIDE AND FORMOTEROL FUMARATE DIHYDRATE 160; 4.5 UG/1; UG/1
2 AEROSOL RESPIRATORY (INHALATION)
Qty: 12 G | Refills: 0 | Status: SHIPPED | OUTPATIENT
Start: 2017-03-24 | End: 2017-04-23

## 2017-03-24 RX ADMIN — MINERAL OIL AND WHITE PETROLATUM 2 APPLICATION: 150; 830 OINTMENT OPHTHALMIC at 06:04

## 2017-03-24 RX ADMIN — TIOTROPIUM BROMIDE 18 MCG: 18 CAPSULE ORAL; RESPIRATORY (INHALATION) at 07:58

## 2017-03-24 RX ADMIN — PREDNISONE 40 MG: 20 TABLET ORAL at 08:13

## 2017-03-24 RX ADMIN — GABAPENTIN 100 MG: 100 CAPSULE ORAL at 08:13

## 2017-03-24 RX ADMIN — FUROSEMIDE 40 MG: 40 TABLET ORAL at 08:13

## 2017-03-24 RX ADMIN — RIVAROXABAN 15 MG: 15 TABLET, FILM COATED ORAL at 08:13

## 2017-03-24 RX ADMIN — SPIRONOLACTONE 25 MG: 25 TABLET, FILM COATED ORAL at 08:13

## 2017-03-24 RX ADMIN — POTASSIUM CHLORIDE 20 MEQ: 1500 TABLET, EXTENDED RELEASE ORAL at 08:13

## 2017-03-24 RX ADMIN — SERTRALINE HYDROCHLORIDE 50 MG: 50 TABLET ORAL at 08:13

## 2017-03-24 RX ADMIN — LEVOTHYROXINE SODIUM 150 MCG: 75 TABLET ORAL at 05:43

## 2017-03-24 RX ADMIN — DOCUSATE SODIUM 100 MG: 100 CAPSULE, LIQUID FILLED ORAL at 05:43

## 2017-03-24 RX ADMIN — ISOSORBIDE MONONITRATE 30 MG: 30 TABLET, EXTENDED RELEASE ORAL at 08:13

## 2017-03-24 RX ADMIN — METOPROLOL TARTRATE 12.5 MG: 25 TABLET ORAL at 08:13

## 2017-03-24 RX ADMIN — BUDESONIDE AND FORMOTEROL FUMARATE DIHYDRATE 2 PUFF: 160; 4.5 AEROSOL RESPIRATORY (INHALATION) at 07:58

## 2017-03-28 LAB — VIRUS SPEC CULT: NORMAL

## 2017-03-29 LAB — FUNGUS SPEC CULT: NORMAL

## 2017-03-30 ENCOUNTER — ALLSCRIPTS OFFICE VISIT (OUTPATIENT)
Dept: OTHER | Facility: OTHER | Age: 82
End: 2017-03-30

## 2017-04-05 LAB — LEGIONELLA SPEC CULT: NORMAL

## 2017-04-28 ENCOUNTER — ALLSCRIPTS OFFICE VISIT (OUTPATIENT)
Dept: OTHER | Facility: OTHER | Age: 82
End: 2017-04-28

## 2017-05-09 LAB
MYCOBACTERIUM SPEC CULT: NORMAL
RHODAMINE-AURAMINE STN SPEC: NORMAL

## 2017-09-26 ENCOUNTER — ALLSCRIPTS OFFICE VISIT (OUTPATIENT)
Dept: OTHER | Facility: OTHER | Age: 82
End: 2017-09-26

## 2018-01-12 NOTE — CONSULTS
Assessment    1  Coronary artery disease (414 00) (I25 10)   2  Paroxysmal atrial fibrillation (427 31) (I48 0)   3  Chronic diastolic congestive heart failure (428 32,428 0) (I50 32)   4  Hypertension (401 9) (I10)   5  Sleep apnea (780 57) (G47 30)   6  CABG    Plan  Coronary artery disease    · Continue with our present treatment plan ; Status:Complete;   Done: 42Qsz7029   Ordered; For:Coronary artery disease; Ordered By:Carlota Troy;   · Restrict your sodium (salt) intake to 2 grams per day ; Status:Complete;   Done:  05Qpc5137   Ordered; For:Coronary artery disease; Ordered By:Carlota Troy;   · Weigh yourself every day ; Status:Complete;   Done: 32Eng3621   Ordered; For:Coronary artery disease; Ordered By:Carlota Troy;   · Follow-up visit in 3 months Evaluation and Treatment  Follow-up  Status: Complete   Done: 16LEW6126   Ordered; For: Coronary artery disease; Ordered By: Luiz Ken Performed:  Due: 67JRO9837; Last Updated By: Rogelio Hammonds; 8/22/2016 11:05:24 AM   · ECHO COMPLETE WITH CONTRAST IF INDICATED; Status:Active; Requested  for:92Cve8580;    Perform:[facility]; Due:23Cti4792; Last Updated Heidy Her; 8/22/2016 11:05:34 AM;Ordered; For:Coronary artery disease; Ordered By:Alexys Troy;    Discussion/Summary    LEROY Wu's recent hospitalization for chest pain was atypical and appear to be musculoskeletal  It is reproducible and associated with back and neck pain, from recurrent falls  In fact even today's exam shows that she has reproducible chest pain similar to what took her to the emergency room  Suggestions at this point in time his pain control  Chronic diastolic CHF - She appears somewhat volume overloaded, but likely at her recent baseline  We'll continue the same diuretic therapy for now  She should watch her weight and sodium intake on a regular basis   We will get an updated echocardiogram  She will also had blood work obtained through her skilled nursing facility on a regular basis  PAF - At some point in the last few years she went through a cardioversion and is now in sinus rhythm  She is also on amiodarone, and takes Xarelto for stroke prevention  We will obtain records from her prior cardiologist for our review  I will also leave it to her nursing home physicians to continue to address her fall risk, and if anticoagulation should be continued indefinitely  CAD - She has a history of coronary bypass grafting  No changes were made today  Other than an echocardiogram no other testing is needed  We will see her back in 3 months  Possible side effects of new medications were reviewed with the patient/guardian today  The patient was counseled regarding diagnostic results, instructions for management, impressions  total time of encounter was 45 minutes  Chief Complaint  [facility] f/u for Chest pain  Patient stated that she has shortness of breath and she has chest pain  History of Present Illness  Cardiology HPI Free Text Note Form [facility]: Ms Junior Liu comes back to see me in consultation regarding her cardiac history and after a recent hospitalization for atypical chest pain  Yolie Gonsalves he is to follow with me, and it has been about 3-1/2 years since I last saw her  We followed her for atrial fibrillation, history of coronary artery disease and chronic diastolic CHF  I saw her in the hospital several years back a few times for respiratory failure and CHF  Since then she has followed with Dr Blaire Kendrick practice, but now comes back to me, being referred by her local skilled nursing facility  Her coronary disease dates back to 36s which she had coronary bypass grafting  She also had repair of an abdominal aortic aneurysm back then  After she followed with us, and transferred over to Dr Armando Jimenes, she tells me she eventually went through a cardioversion for atrial fibrillation   She is now on amiodarone and takes Xarelto for stroke prevention  She does not recall any other recent testing  She stated that she last saw him a few months back  She was in the hospital about a month ago with chest pain  She does have a history of frequent falls, and this most recent chest pain was right after a fall  She had hurt her back as well  Chest pain was reproducible with palpation  She was discharged the next day after observation  Terrazas Riding continues to get on-and-off chest pains  They are random, sometimes persisting all day, and are associated with her back pain  There worse in certain positions and they are reproducible  She also has pains in her shoulder and neck, and these all seem musculoskeletal  She has chronic shortness of breath which is certainly multifactorial  She denies any orthopnea or PND  She does have chronic lower extremity edema  She also has issues with urinary retention, and is status post catheter placement  She denies any recent Syncope  Atrial Fibrillation (Follow-Up): The patient presents with paroxysmal atrial fibrillation  The treatment strategy for this patient is rhythm control  She states her atrial fibrillation has been stable since the last visit  She has no comorbid illnesses  She has no significant interval events  Symptoms: The patient is currently asymptomatic  Associated symptoms include no syncope, no focal neurologic deficit, no tendency for easy bleeding and no tendency for easy bruising  Risks: increased risk for falling  Medications: the patient is adherent with her medication regimen  She denies medication side effects  Coronary Artery Disease (Follow-Up): The patient states she has been stable with her coronary artery disease symptoms since the last visit  Comorbid Illnesses: cardiac failure and hypertension  She has no known CAD complications  She has no significant interval events     Symptoms: stable chest pain when at rest, denies exertional chest pain, stable dyspnea, stable fatigue and stable exercise intolerance  Associated symptoms include edema, but no syncope, no palpitations, no PND and no orthopnea  She denies nitroglycerin use since the last visit  Medications: the patient is adherent with her medication regimen  Review of Systems      Cardiac: chest pain and has swelling in the , but as noted in HPI  Skin: No complaints of nonhealing sores or skin rash  Genitourinary: No complaints of recurrent urinary tract infections, frequent urination at night, difficult urination, blood in urine, kidney stones, loss of bladder control, kidney problems, denies any birth control or hormone replacement, is not post menopausal, not currently pregnant  Psychological: No complaints of feeling depressed, anxiety, panic attacks, or difficulty concentrating  General: No complaints of trouble sleeping, lack of energy, fatigue, appetite changes, weight changes, fever, frequent infections, or night sweats  Respiratory: shortness of breath, but as noted in HPI  HEENT: No complaints of serious problems, hearing problems, nose problems, throat problems, or snoring  Gastrointestinal: No complaints of liver problems, nausea, vomiting, heartburn, constipation, bloody stools, diarrhea, problems swallowing, adbominal pain, or rectal bleeding  Hematologic: No complaints of bleeding disorders, anemia, blood clots, or excessive brusing  Neurological: No complaints of numbness, tingling, dizziness, weakness, seizures, headaches, syncope or fainting, AM fatigue, daytime sleepiness, no witnessed apnea episodes  Musculoskeletal: arthritis and back pain     ROS reviewed  Active Problems    1  Anxiety disorder (300 00) (F41 9)   2  Atrial fibrillation (427 31) (I48 91)   3  Depression (311) (F32 9)   4  Edema (782 3) (R60 9)   5  Hypertension (401 9) (I10)   6  Knee pain (719 46) (M25 569)   7  Osteoarthritis of knee (715 36) (M17 9)   8   Primary osteoarthritis of both knees (715 16) (M17 0)   9  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) (M19 019)   10  Shoulder joint pain, unspecified laterality   11  Sleep apnea (780 57) (G47 30)    Past Medical History    The active problems and past medical history were reviewed and updated today  Surgical History    · History of Abdominal Aortic Aneurysm Repair For Dilation Or Occlusion   · History of Appendectomy   · History of Arthroscopy Knee Left   · History of Arthroscopy Knee Right    The surgical history was reviewed and updated today  Family History  Mother    · No pertinent family history  Father    · No pertinent family history  Family History    · Family history unremarkable  Family History Reviewed: The family history was reviewed and updated today  Social History    · Former smoker (E15 79) (R42 086)   · Retired   ·   The social history was reviewed and updated today  The social history was reviewed and is unchanged  Current Meds   1  Amiodarone HCl - 200 MG Oral Tablet; Therapy: 14Rzz7836 to Recorded   2  Aspirin 325 MG Oral Tablet; TAKE 1 TABLET DAILY; Therapy: 24FFB1832 to (Evaluate:24Oug2641) Recorded   3  Atorvastatin Calcium 10 MG Oral Tablet; TAKE 1 TABLET DAILY AS DIRECTED; Therapy: 50TIB1083 to Recorded   4  Betamethasone Sod Phos & Acet 6 (3-3) MG/ML Injection Suspension; USE AS   DIRECTED; To Be Done: 04BJM0048; Status: HOLD FOR - Administration Ordered   5  CeleBREX 200 MG Oral Capsule; TAKE 1 CAPSULE DAILY AS NEEDED; Therapy: 58PTK4718 to Recorded   6  Dulcolax 10 MG Rectal Suppository; INSERT 1 SUPPOSITORY RECTALLY ONCE DAILY; Therapy: 51TLD4821 to Recorded   7  Fish Oil 1000 MG Oral Capsule; TAKE 1 CAPSULE Daily; Therapy: 39TGU2386 to ([phone]) Recorded   8  Fluticasone Propionate 50 MCG/ACT Nasal Suspension; Therapy: 28MLD5695 to Recorded   9  Furosemide 40 MG Oral Tablet; Therapy: 68MLX5131 to Recorded   10  Gabapentin 100 MG Oral Capsule;     Therapy: 77XKS2160 to Recorded   11  Gabapentin 600 MG Oral Tablet; Therapy: 37Evk0627 to Recorded   12  HydrOXYzine HCl - 25 MG Oral Tablet; Therapy: 41CQX8848 to Recorded   13  Isosorbide Mononitrate ER 30 MG Oral Tablet Extended Release 24 Hour; Therapy: 30QVW2688 to Recorded   14  Levothyroxine Sodium 25 MCG CAPS; Therapy: (Keyona Anchors) to Recorded   15  LORazepam TABS; TAKE 1 TABLET Bedtime; Therapy: 31DBP1479 to Recorded   16  Metolazone 5 MG Oral Tablet; TAKE 1 TABLET ONCE DAILY; Therapy: 93YQV8422 to (Evaluate:02Jan2014)  Requested for: 80QJT3192; Last    Rx:06Jun2013 Ordered   17  Metoprolol Tartrate 25 MG Oral Tablet; TAKE 1 TABLET DAILY; Therapy: 65ZLQ6158 to Recorded   18  Miconazole 3 200 MG Vaginal Suppository; Therapy: 53IXP8592 to Recorded   19  Milk of Magnesia 1200 MG/15ML Oral Suspension; USE AS DIRECTED; Therapy: 14TJI3196 to Recorded   20  Omeprazole 40 MG Oral Capsule Delayed Release; TAKE 1 CAPSULE Bedtime; Therapy: 21PBJ6553 to Recorded   21  Oxycodone-Acetaminophen 5-325 MG Oral Tablet; TAKE 1 TABLET EVERY 4 TO 6    HOURS AS NEEDED FOR PAIN;    Therapy: 97Nfn2645 to Recorded   22  Potassium Chloride Shani ER 20 MEQ Oral Tablet Extended Release; TAKE 1 TABLET    EVERY 12 HOURS; Therapy: 97JPG5841 to (Evaluate:22Nov2013) Recorded   23  QUEtiapine Fumarate 25 MG Oral Tablet; Therapy: 12XLS7889 to Recorded   24  Sertraline HCl - 50 MG Oral Tablet; Take 1 tablet twice daily; Therapy: 86JQU5241 to Recorded   25  Simvastatin 20 MG Oral Tablet; Therapy: 03JUQ1898 to Recorded   26  Spiriva HandiHaler 18 MCG Inhalation Capsule; Therapy: 71TWV5576 to Recorded   27  Spironolactone 25 MG Oral Tablet; Therapy: 02Prf4957 to Recorded   28  Torsemide 20 MG Oral Tablet; TAKE 2 TABLETS DAILY; Therapy: 53PAE3990 to (Evaluate:20Mar2014)  Requested for: 39JLC3515; Last    Rx:25Mar2013 Ordered   29   TraMADol HCl - 50 MG Oral Tablet; TAKE 1 TABLET EVERY 4 TO 6 HOURS AS NEEDED FOR PAIN;    Therapy: 48Lbz3942 to Recorded   30  Tylenol 325 MG Oral Tablet; TAKE 1 TO 2 TABLETS EVERY 4 HOURS AS NEEDED; Therapy: 15TYH0449 to Recorded   31  Vitamin C 500 MG Oral Capsule; TAKE 1 CAPSULE DAILY; Therapy: 59XWV8262 to Recorded   32  Xarelto 15 MG Oral Tablet; Therapy: 38Jkd7183 to Recorded    Allergies    1  Lyrica CAPS   2  Iodinated Contrast Media    Vitals  Signs    Systolic: 901  Diastolic: 64  Heart Rate: 65  Respiration: 14  O2 Saturation: 89  Height: 5 ft 8 in  Weight: 249 lb   BMI Calculated: 37 86  BSA Calculated: 2 24    Physical Exam    Constitutional   General appearance: No acute distress, well appearing and well nourished  morbidly obese  Eyes   Conjunctiva and Sclera examination: Conjunctiva pink, sclera anicteric  Ears, Nose, Mouth, and Throat - Oropharynx: Clear, nares are clear, mucous membranes are moist    Neck   Neck and thyroid: Normal, supple, trachea midline, no thyromegaly  Pulmonary   Respiratory effort: No increased work of breathing or signs of respiratory distress  Auscultation of lungs: Abnormal   Auscultation of the lungs revealed expiratory wheezing  no rhonchi  Cardiovascular   Auscultation of heart: Abnormal   The heart rate was normal  The rhythm was regular  Heart sounds: the heart sounds were distant, but normal S1 and normal S2  no murmurs were heard  Carotid pulses: Normal, 2+ bilaterally  Peripheral vascular exam: Normal pulses throughout, no tenderness, erythema or swelling  Pedal pulses: Normal, 2+ bilaterally  Examination of extremities for edema and/or varicosities: Abnormal   bilateral ankle 2+ pitting edema and bilateral pretibial 2+ pitting edema  varicosities noted bilaterally  Abdomen   Abdomen: Non-tender and no distention  Liver and spleen: No hepatomegaly or splenomegaly  Musculoskeletal Gait and station: Normal gait  Digits and nails: Normal without clubbing or cyanosis   Inspection/palpation of joints, bones, and muscles: Normal, ROM normal     Skin - Skin and subcutaneous tissue: Normal without rashes or lesions  Skin is warm and well perfused, normal turgor  Neurologic - Cranial nerves: II - XII intact  Speech: Normal     Psychiatric - Orientation to person, place, and time: Normal  Mood and affect: Normal       End of Encounter Meds    1  Betamethasone Sod Phos & Acet 6 (3-3) MG/ML Injection Suspension ([employer]   Soluspan); USE AS DIRECTED; To Be Done: 72NCM9268; Status: HOLD FOR -   Administration Ordered    2  Torsemide 20 MG Oral Tablet (Demadex); TAKE 2 TABLETS DAILY; Therapy: 72THM7472 to (Evaluate:20Mar2014)  Requested for: 05CIU8695; Last   Rx:25Mar2013 Ordered    3  Metolazone 5 MG Oral Tablet; TAKE 1 TABLET ONCE DAILY; Therapy: 52DEL0347 to (Evaluate:02Jan2014)  Requested for: 21MWL1311; Last   Rx:06Jun2013 Ordered    4  Amiodarone HCl - 200 MG Oral Tablet; Therapy: 43Och5014 to Recorded   5  Aspirin 325 MG Oral Tablet; TAKE 1 TABLET DAILY; Therapy: 09DZJ9551 to (Evaluate:57Nxy8778) Recorded   6  Atorvastatin Calcium 10 MG Oral Tablet; TAKE 1 TABLET DAILY AS DIRECTED; Therapy: 44TNE4417 to Recorded   7  CeleBREX 200 MG Oral Capsule (Celecoxib); TAKE 1 CAPSULE DAILY AS NEEDED; Therapy: 46OGP5959 to Recorded   8  Dulcolax 10 MG Rectal Suppository; INSERT 1 SUPPOSITORY RECTALLY ONCE DAILY; Therapy: 46NFQ5498 to Recorded   9  Fish Oil 1000 MG Oral Capsule; TAKE 1 CAPSULE Daily; Therapy: 60RIM4728 to ([phone]) Recorded   10  Fluticasone Propionate 50 MCG/ACT Nasal Suspension; Therapy: 76PUY5642 to Recorded   11  Furosemide 40 MG Oral Tablet; Therapy: 64YZB5436 to Recorded   12  Gabapentin 100 MG Oral Capsule; Therapy: 36DLN7947 to Recorded   13  Gabapentin 600 MG Oral Tablet; Therapy: 74Lhm0464 to Recorded   14  HydrOXYzine HCl - 25 MG Oral Tablet; Therapy: 79JED6587 to Recorded   15   Isosorbide Mononitrate ER 30 MG Oral Tablet Extended Release 24 Hour; Therapy: 01ZBT1232 to Recorded   16  Levothyroxine Sodium 25 MCG CAPS; Therapy: (Compa Claros) to Recorded   17  LORazepam TABS; TAKE 1 TABLET Bedtime; Therapy: 93XBM3827 to Recorded   18  Metoprolol Tartrate 25 MG Oral Tablet; TAKE 1 TABLET DAILY; Therapy: 24IXE4346 to Recorded   19  Miconazole 3 200 MG Vaginal Suppository; Therapy: 20AAS9328 to Recorded   20  Milk of Magnesia 1200 MG/15ML Oral Suspension; USE AS DIRECTED; Therapy: 79IJV7032 to Recorded   21  Omeprazole 40 MG Oral Capsule Delayed Release; TAKE 1 CAPSULE Bedtime; Therapy: 00UUW2781 to Recorded   22  Oxycodone-Acetaminophen 5-325 MG Oral Tablet; TAKE 1 TABLET EVERY 4 TO 6    HOURS AS NEEDED FOR PAIN;    Therapy: 45Yik3113 to Recorded   23  Potassium Chloride Shani ER 20 MEQ Oral Tablet Extended Release; TAKE 1 TABLET    EVERY 12 HOURS; Therapy: 36GUB5228 to (Evaluate:22Nov2013) Recorded   24  QUEtiapine Fumarate 25 MG Oral Tablet; Therapy: 24PGL6166 to Recorded   25  Sertraline HCl - 50 MG Oral Tablet; Take 1 tablet twice daily; Therapy: 09KNX2609 to Recorded   26  Simvastatin 20 MG Oral Tablet; Therapy: 34WZL0300 to Recorded   27  Spiriva HandiHaler 18 MCG Inhalation Capsule; Therapy: 26RTU6417 to Recorded   28  Spironolactone 25 MG Oral Tablet; Therapy: 72Lyf8165 to Recorded   29  TraMADol HCl - 50 MG Oral Tablet; TAKE 1 TABLET EVERY 4 TO 6 HOURS AS NEEDED    FOR PAIN;    Therapy: 15Fcm2221 to Recorded   30  Tylenol 325 MG Oral Tablet; TAKE 1 TO 2 TABLETS EVERY 4 HOURS AS NEEDED; Therapy: 04ANC4353 to Recorded   31  Vitamin C 500 MG Oral Capsule; TAKE 1 CAPSULE DAILY; Therapy: 90BMY2843 to Recorded   32  Xarelto 15 MG Oral Tablet;     Therapy: 47WSV3829 to Recorded    Signatures   Electronically signed by : DEVORAH Gómez ; Aug 22 2016 11:12AM EST                       (Author)

## 2018-01-13 VITALS
WEIGHT: 268 LBS | SYSTOLIC BLOOD PRESSURE: 126 MMHG | HEIGHT: 68 IN | DIASTOLIC BLOOD PRESSURE: 74 MMHG | HEART RATE: 73 BPM | BODY MASS INDEX: 40.62 KG/M2

## 2018-01-13 VITALS
SYSTOLIC BLOOD PRESSURE: 124 MMHG | RESPIRATION RATE: 16 BRPM | TEMPERATURE: 97.7 F | HEART RATE: 66 BPM | BODY MASS INDEX: 40.92 KG/M2 | WEIGHT: 270 LBS | OXYGEN SATURATION: 98 % | DIASTOLIC BLOOD PRESSURE: 76 MMHG | HEIGHT: 68 IN

## 2018-01-13 VITALS
SYSTOLIC BLOOD PRESSURE: 120 MMHG | HEART RATE: 64 BPM | BODY MASS INDEX: 39.4 KG/M2 | DIASTOLIC BLOOD PRESSURE: 60 MMHG | RESPIRATION RATE: 18 BRPM | HEIGHT: 68 IN | WEIGHT: 260 LBS

## 2018-01-14 VITALS
DIASTOLIC BLOOD PRESSURE: 76 MMHG | TEMPERATURE: 97.6 F | WEIGHT: 254 LBS | RESPIRATION RATE: 16 BRPM | HEART RATE: 64 BPM | OXYGEN SATURATION: 92 % | SYSTOLIC BLOOD PRESSURE: 138 MMHG | HEIGHT: 68 IN | BODY MASS INDEX: 38.49 KG/M2

## 2018-01-16 NOTE — PROCEDURES
Procedures by Shanel Medrano DO at 3/20/2017   7:43 AM      Author:  Shanel Medrano DO Service:  Pulmonology Author Type:  Physician     Filed:  3/20/2017  7:51 AM Date of Service:  3/20/2017  7:43 AM Status:  Signed     :  Shanel Medrano DO (Physician)         Procedure Orders:       1  Bronchoscopy [[id number]] ordered by Shanel Medrano DO at 03/20/17 0745                 Post-procedure Diagnoses:       1  Abnormal chest CT [R93 8]                   Bronchoscopy  Date/Time: 3/20/2017 7:45 AM  Performed by: Laura Kim by: Zahira Arredondo     Patient location:  Other (comment) Maryanne Ramos)  Other Assisting  Provider: No    Consent:     Consent obtained:  Written    Consent given by:  Patient    Risks discussed: Adverse reaction to sedation, bleeding and infection    Alternatives discussed:  Alternative treatment and no treatment  Universal protocol:     Procedure explained and questions answered to patient or proxy's satisfaction: yes      Relevant documents present and verified: yes      Test results available and properly labeled: yes       Imaging studies available: yes      Required blood products, implants, devices and special equipment available: yes      Site/side marked: no      Immediately prior to procedure a time out was called: yes      Patient identity confirmed:  Verbally with patient, arm band, hospital-assigned identification number and provided demographic data  Indications:     Procedure Purpose: diagnostic      Indications: other (comment)      Indications comment:  Abnormal CT Chest  Sedation:     Sedation type:  Per anesthesia  Anesthesia (see MAR for exact dosages):      Anesthesia method:  Topical application    Topical anesthetic:  Lidocaine gel  Scope passed:      right nasal  Upper Airway:     Oropharnyx: abnormal (comment)      Oropharnyx comment:  Redundant tissues    Epiglottis: normal      Vocal cords movement: normal      Trachea: normal      Trachea comment:  Posterior membrane collapse with expiration    Joy:  Normal  Airway:     Airway: There was significant posterior membrane collapse with expiration bilateral   No masses, secretions, mucosal abnormalities  Procedure details:     Description: The bronchoscope was advanced bilateral   All segments were visualized  Procedures performed:     Lavage site:  Lingula BAL - 3 serial alloquats of 40cc instilled - a total of 30 ccs returned of clear fluid with some white froth - no signs of alveolar hemorrhage  Then another BAL was performed in apical segment of MAUREEN  36 ccs was instilled  and 15 ccs of cloudy drainage returned  Post-procedure details:     Chest x-ray performed: no      Patient tolerance of procedure: Tolerated well, no immediate complications    Complication (if applicable):  None  Final Diagnosis/Findings:      Normal bronchoscopy except for some posterior membrane collapse    BAL fluid sent for routine cultures/cytology                     Received for:Nicole Jairo Curling DO  Mar 20 2017  7:51AM [facility] Standard Time

## 2018-10-10 ENCOUNTER — OFFICE VISIT (OUTPATIENT)
Dept: PULMONOLOGY | Facility: HOSPITAL | Age: 83
End: 2018-10-10
Payer: MEDICARE

## 2018-10-10 VITALS
HEART RATE: 73 BPM | WEIGHT: 262 LBS | SYSTOLIC BLOOD PRESSURE: 132 MMHG | HEIGHT: 68 IN | DIASTOLIC BLOOD PRESSURE: 76 MMHG | BODY MASS INDEX: 39.71 KG/M2 | OXYGEN SATURATION: 89 % | TEMPERATURE: 97.4 F

## 2018-10-10 DIAGNOSIS — J44.9 COPD (CHRONIC OBSTRUCTIVE PULMONARY DISEASE) (HCC): Primary | ICD-10-CM

## 2018-10-10 DIAGNOSIS — J98.4 AMIODARONE PULMONARY TOXICITY: ICD-10-CM

## 2018-10-10 DIAGNOSIS — R09.02 HYPOXIA: ICD-10-CM

## 2018-10-10 DIAGNOSIS — T46.2X5A AMIODARONE PULMONARY TOXICITY: ICD-10-CM

## 2018-10-10 PROCEDURE — 99213 OFFICE O/P EST LOW 20 MIN: CPT | Performed by: INTERNAL MEDICINE

## 2018-10-10 PROCEDURE — 94010 BREATHING CAPACITY TEST: CPT | Performed by: INTERNAL MEDICINE

## 2018-10-10 NOTE — LETTER
October 10, 2018     Percy [address]so [address]    Patient: Jan Stoner   YOB: 1934   Date of Visit: 10/10/2018       Dear Dr Melani Bhatt: Thank you for referring Dorothy Quan to me for evaluation  Below are my notes for this consultation  If you have questions, please do not hesitate to call me  I look forward to following your patient along with you  Sincerely,        Enrique De Leon DO        CC: Obey Jean, DO Enrique De Leon DO  10/10/2018  4:15 PM  Sign at close encounter  Assessment:    COPD (chronic obstructive pulmonary disease) ([facility] [address] )  The patient appears stable from a pulmonary standpoint today  She has no obstruction on spirometry however she did not perform a to ATS standards  Will continue Breo and Spiriva as currently prescribed  I am unsure if she is up-to-date on her immunizations  I recommended that she get a flu shot  This is managed at her nursing home  Hypoxia  I am concerned with the patient's oxygen level of 89% on room air today that she may need supplemental oxygen  She tells me that she does not do any activities currently at the nursing home but is getting ready to start physical therapy  She tells me that she has been tested previously at night and did not require supplemental oxygen  I did request at the nursing home that they do spot pulse oximetry testing while at rest, while doing physical therapy and overnight  For any oxygen saturation less than 88% she will need supplemental oxygen  The patient states she does not want to wear oxygen because it feels constricted on her neck  Amiodarone pulmonary toxicity  The patient is no longer taking amiodarone  Unfortunately she is back in atrial fibrillation  She has not had a chest x-ray in over a year  I have asked her nursing home to perform a chest x-ray for us to follow up on    I do not believe she warrants steroids at this time       Plan:    Diagnoses and all orders for this visit:    COPD (chronic obstructive pulmonary disease) ([facility] [address] )  -     POCT spirometry  -     XR chest portable; Future    Hypoxia  -     Pulse oximetry overnight    Amiodarone pulmonary toxicity  -     XR chest portable; Future        Follow-up:   1 year      HPI:  The patient reports she is "good and bad"  She was seen by cardiology recently and told that she was in atrial fibrillation again despite recent cardioversion    She still has problems breathing with minimal activity  On occasion she has a cough - nothing debilitating  No significant chest pain or wheeze  She takes Breo once daily and Spiriva once daily  I do not see albuterol on her list     She is not on supplemental oxygen at this time    She lives in a nursing home and is in a wheelchair  She tells me that she does minimal activity however she is getting radius start physical therapy  Review of Systems    The following portions of the patient's history were reviewed and updated as appropriate: allergies, current medications, past family history, past medical history, past social history, past surgical history and problem list     VITALS:  Vitals:    10/10/18 1509   BP: 132/76   BP Location: Right arm   Patient Position: Sitting   Cuff Size: Standard   Pulse: 73   Temp: (!) 97 4 °F (36 3 °C)   TempSrc: Tympanic   SpO2: (!) 89%   Weight: 119 kg (262 lb)   Height: 5' 8" (1 727 m)       Physical Exam  General:  Patient is awake, alert, non-toxic and in no acute respiratory distress  Neck: No JVD  CV:  Regular, +S1 and S2, No murmurs, gallops or rubs appreciated  Lungs:   Decreased breath sounds in all lung fields without wheeze, rales or rhonchi  Abdomen: Soft, +BS, Non-tender, non-distended  Extremities: No clubbing, cyanosis or edema  Neuro: No focal deficits        Diagnostic Testing:    Office Spirometry Results:     Spirometry:  FEV1/FVC Ratio is 71  FEV1 is 0 88L which is 40% predicted    FVC is 1 24L which is 42% predicted       IMPRESSION:  Severe restriction however patient was unable to perform test accurately      CBC:  Lab Results   Component Value Date    WBC 6 28 03/23/2017    HGB 12 8 03/23/2017    HCT 42 3 03/23/2017    MCV 98 03/23/2017     03/23/2017         BMP:   Lab Results   Component Value Date     03/24/2017    K 4 4 03/24/2017     03/24/2017    CO2 29 03/24/2017    ANIONGAP 3 (L) 07/21/2015    BUN 19 03/24/2017    CREATININE 0 96 03/24/2017    GLUCOSE 91 07/21/2015    CALCIUM 8 7 03/24/2017    AST 10 12/17/2016    ALT 10 (L) 12/17/2016    ALKPHOS 49 12/17/2016    PROT 7 3 03/29/2015    BILITOT 0 6 03/29/2015    EGFR 55 6 03/24/2017     Cory Costello DO

## 2018-10-10 NOTE — PROGRESS NOTES
Assessment:    COPD (chronic obstructive pulmonary disease) ([facility] )  The patient appears stable from a pulmonary standpoint today  She has no obstruction on spirometry however she did not perform a to ATS standards  Will continue Breo and Spiriva as currently prescribed  I am unsure if she is up-to-date on her immunizations  I recommended that she get a flu shot  This is managed at her nursing home  Hypoxia  I am concerned with the patient's oxygen level of 89% on room air today that she may need supplemental oxygen  She tells me that she does not do any activities currently at the nursing home but is getting ready to start physical therapy  She tells me that she has been tested previously at night and did not require supplemental oxygen  I did request at the nursing home that they do spot pulse oximetry testing while at rest, while doing physical therapy and overnight  For any oxygen saturation less than 88% she will need supplemental oxygen  The patient states she does not want to wear oxygen because it feels constricted on her neck  Amiodarone pulmonary toxicity  The patient is no longer taking amiodarone  Unfortunately she is back in atrial fibrillation  She has not had a chest x-ray in over a year  I have asked her nursing home to perform a chest x-ray for us to follow up on  I do not believe she warrants steroids at this time  Plan:    Diagnoses and all orders for this visit:    COPD (chronic obstructive pulmonary disease) ([facility] )  -     POCT spirometry  -     XR chest portable; Future    Hypoxia  -     Pulse oximetry overnight    Amiodarone pulmonary toxicity  -     XR chest portable;  Future        Follow-up:   1 year      HPI:  The patient reports she is "good and bad"  She was seen by cardiology recently and told that she was in atrial fibrillation again despite recent cardioversion    She still has problems breathing with minimal activity  On occasion she has a cough - nothing debilitating  No significant chest pain or wheeze  She takes Breo once daily and Spiriva once daily  I do not see albuterol on her list     She is not on supplemental oxygen at this time    She lives in a nursing home and is in a wheelchair  She tells me that she does minimal activity however she is getting radius start physical therapy  Review of Systems    The following portions of the patient's history were reviewed and updated as appropriate: allergies, current medications, past family history, past medical history, past social history, past surgical history and problem list     VITALS:  Vitals:    10/10/18 1509   BP: 132/76   BP Location: Right arm   Patient Position: Sitting   Cuff Size: Standard   Pulse: 73   Temp: (!) 97 4 °F (36 3 °C)   TempSrc: Tympanic   SpO2: (!) 89%   Weight: 119 kg (262 lb)   Height: 5' 8" (1 727 m)       Physical Exam  General:  Patient is awake, alert, non-toxic and in no acute respiratory distress  Neck: No JVD  CV:  Regular, +S1 and S2, No murmurs, gallops or rubs appreciated  Lungs:   Decreased breath sounds in all lung fields without wheeze, rales or rhonchi  Abdomen: Soft, +BS, Non-tender, non-distended  Extremities: No clubbing, cyanosis or edema  Neuro: No focal deficits        Diagnostic Testing:    Office Spirometry Results:     Spirometry:  FEV1/FVC Ratio is 71  FEV1 is 0 88L which is 40% predicted  FVC is 1 24L which is 42% predicted       IMPRESSION:  Severe restriction however patient was unable to perform test accurately      CBC:  Lab Results   Component Value Date    WBC 6 28 03/23/2017    HGB 12 8 03/23/2017    HCT 42 3 03/23/2017    MCV 98 03/23/2017     03/23/2017         BMP:   Lab Results   Component Value Date     03/24/2017    K 4 4 03/24/2017     03/24/2017    CO2 29 03/24/2017    ANIONGAP 3 (L) 07/21/2015    BUN 19 03/24/2017    CREATININE 0 96 03/24/2017    GLUCOSE 91 07/21/2015    CALCIUM 8 7 03/24/2017    AST 10 12/17/2016    ALT 10 (L) 12/17/2016    ALKPHOS 49 12/17/2016    PROT 7 3 03/29/2015    BILITOT 0 6 03/29/2015    EGFR 55 6 03/24/2017     Enrique De Leon DO

## 2018-10-10 NOTE — ASSESSMENT & PLAN NOTE
I am concerned with the patient's oxygen level of 89% on room air today that she may need supplemental oxygen  She tells me that she does not do any activities currently at the nursing home but is getting ready to start physical therapy  She tells me that she has been tested previously at night and did not require supplemental oxygen  I did request at the nursing home that they do spot pulse oximetry testing while at rest, while doing physical therapy and overnight  For any oxygen saturation less than 88% she will need supplemental oxygen  The patient states she does not want to wear oxygen because it feels constricted on her neck

## 2018-10-10 NOTE — ASSESSMENT & PLAN NOTE
The patient appears stable from a pulmonary standpoint today  She has no obstruction on spirometry however she did not perform a to ATS standards  Will continue Breo and Spiriva as currently prescribed  I am unsure if she is up-to-date on her immunizations  I recommended that she get a flu shot  This is managed at her nursing home

## 2018-10-10 NOTE — ASSESSMENT & PLAN NOTE
The patient is no longer taking amiodarone  Unfortunately she is back in atrial fibrillation  She has not had a chest x-ray in over a year  I have asked her nursing home to perform a chest x-ray for us to follow up on  I do not believe she warrants steroids at this time

## 2020-01-13 ENCOUNTER — HOSPITAL ENCOUNTER (INPATIENT)
Facility: HOSPITAL | Age: 85
LOS: 9 days | Discharge: NURSING, REHAB OR LONG-TERM CARE | DRG: 885 | End: 2020-01-22
Attending: EMERGENCY MEDICINE | Admitting: PSYCHIATRY & NEUROLOGY
Payer: COMMERCIAL

## 2020-01-13 ENCOUNTER — APPOINTMENT (OUTPATIENT)
Dept: RADIOLOGY | Facility: HOSPITAL | Age: 85
End: 2020-01-13
Payer: COMMERCIAL

## 2020-01-13 DIAGNOSIS — N39.0 UTI (URINARY TRACT INFECTION): ICD-10-CM

## 2020-01-13 DIAGNOSIS — G47.9 SLEEPING DIFFICULTIES: ICD-10-CM

## 2020-01-13 DIAGNOSIS — R21 RASH: ICD-10-CM

## 2020-01-13 DIAGNOSIS — J18.9 PNEUMONIA: ICD-10-CM

## 2020-01-13 DIAGNOSIS — Z00.8 MEDICAL CLEARANCE FOR PSYCHIATRIC ADMISSION: ICD-10-CM

## 2020-01-13 DIAGNOSIS — K59.00 CONSTIPATION: ICD-10-CM

## 2020-01-13 DIAGNOSIS — R45.851 DEPRESSION WITH SUICIDAL IDEATION: Primary | ICD-10-CM

## 2020-01-13 DIAGNOSIS — F32.A DEPRESSION WITH SUICIDAL IDEATION: Primary | ICD-10-CM

## 2020-01-13 DIAGNOSIS — I48.21 PERMANENT ATRIAL FIBRILLATION (HCC): ICD-10-CM

## 2020-01-13 DIAGNOSIS — Z00.8 MEDICAL CLEARANCE FOR PSYCHIATRIC ADMISSION: Primary | ICD-10-CM

## 2020-01-13 LAB
ALBUMIN SERPL BCP-MCNC: 3.9 G/DL (ref 3–5.2)
ALP SERPL-CCNC: 63 U/L (ref 43–122)
ALT SERPL W P-5'-P-CCNC: 20 U/L (ref 9–52)
AMPHETAMINES SERPL QL SCN: NEGATIVE
ANION GAP SERPL CALCULATED.3IONS-SCNC: 10 MMOL/L (ref 5–14)
AST SERPL W P-5'-P-CCNC: 19 U/L (ref 14–36)
BACTERIA UR QL AUTO: ABNORMAL /HPF
BARBITURATES UR QL: NEGATIVE
BASOPHILS # BLD AUTO: 0 THOUSANDS/ΜL (ref 0–0.1)
BASOPHILS NFR BLD AUTO: 0 % (ref 0–1)
BENZODIAZ UR QL: NEGATIVE
BILIRUB SERPL-MCNC: 0.6 MG/DL
BILIRUB UR QL STRIP: NEGATIVE
BUN SERPL-MCNC: 18 MG/DL (ref 5–25)
CALCIUM SERPL-MCNC: 9.3 MG/DL (ref 8.4–10.2)
CHLORIDE SERPL-SCNC: 99 MMOL/L (ref 97–108)
CLARITY UR: ABNORMAL
CO2 SERPL-SCNC: 30 MMOL/L (ref 22–30)
COCAINE UR QL: NEGATIVE
COLOR UR: YELLOW
CREAT SERPL-MCNC: 1.01 MG/DL (ref 0.6–1.2)
EOSINOPHIL # BLD AUTO: 0.1 THOUSAND/ΜL (ref 0–0.4)
EOSINOPHIL NFR BLD AUTO: 3 % (ref 0–6)
ERYTHROCYTE [DISTWIDTH] IN BLOOD BY AUTOMATED COUNT: 13.9 %
ETHANOL SERPL-MCNC: <10 MG/DL (ref 0–10)
GFR SERPL CREATININE-BSD FRML MDRD: 51 ML/MIN/1.73SQ M
GLUCOSE SERPL-MCNC: 98 MG/DL (ref 70–99)
GLUCOSE UR STRIP-MCNC: NEGATIVE MG/DL
HCT VFR BLD AUTO: 46.1 % (ref 36–46)
HGB BLD-MCNC: 15.5 G/DL (ref 12–16)
HGB UR QL STRIP.AUTO: 25
KETONES UR STRIP-MCNC: NEGATIVE MG/DL
LEUKOCYTE ESTERASE UR QL STRIP: 500
LYMPHOCYTES # BLD AUTO: 0.9 THOUSANDS/ΜL (ref 0.5–4)
LYMPHOCYTES NFR BLD AUTO: 16 % (ref 25–45)
MCH RBC QN AUTO: 30.8 PG (ref 26.8–34.3)
MCHC RBC AUTO-ENTMCNC: 33.7 G/DL (ref 31.4–37.4)
MCV RBC AUTO: 92 FL (ref 80–100)
METHADONE UR QL: NEGATIVE
MONOCYTES # BLD AUTO: 0.5 THOUSAND/ΜL (ref 0.2–0.9)
MONOCYTES NFR BLD AUTO: 8 % (ref 1–10)
NEUTROPHILS # BLD AUTO: 4.4 THOUSANDS/ΜL (ref 1.8–7.8)
NEUTS SEG NFR BLD AUTO: 73 % (ref 45–65)
NITRITE UR QL STRIP: NEGATIVE
NON-SQ EPI CELLS URNS QL MICRO: ABNORMAL /HPF
OPIATES UR QL SCN: NEGATIVE
PCP UR QL: NEGATIVE
PH UR STRIP.AUTO: 6.5 [PH]
PLATELET # BLD AUTO: 195 THOUSANDS/UL (ref 150–450)
PMV BLD AUTO: 7.4 FL (ref 8.9–12.7)
POTASSIUM SERPL-SCNC: 3.8 MMOL/L (ref 3.6–5)
PROT SERPL-MCNC: 7.9 G/DL (ref 5.9–8.4)
PROT UR STRIP-MCNC: ABNORMAL MG/DL
RBC # BLD AUTO: 5.03 MILLION/UL (ref 4–5.2)
RBC #/AREA URNS AUTO: ABNORMAL /HPF
SODIUM SERPL-SCNC: 139 MMOL/L (ref 137–147)
SP GR UR STRIP.AUTO: 1.01 (ref 1–1.04)
THC UR QL: NEGATIVE
TROPONIN I SERPL-MCNC: <0.01 NG/ML (ref 0–0.03)
TSH SERPL DL<=0.05 MIU/L-ACNC: 0.52 UIU/ML (ref 0.47–4.68)
UROBILINOGEN UA: NEGATIVE MG/DL
WBC # BLD AUTO: 6 THOUSAND/UL (ref 4.31–10.16)
WBC #/AREA URNS AUTO: ABNORMAL /HPF

## 2020-01-13 PROCEDURE — 80053 COMPREHEN METABOLIC PANEL: CPT | Performed by: EMERGENCY MEDICINE

## 2020-01-13 PROCEDURE — 81001 URINALYSIS AUTO W/SCOPE: CPT | Performed by: EMERGENCY MEDICINE

## 2020-01-13 PROCEDURE — 84443 ASSAY THYROID STIM HORMONE: CPT | Performed by: EMERGENCY MEDICINE

## 2020-01-13 PROCEDURE — 87086 URINE CULTURE/COLONY COUNT: CPT | Performed by: EMERGENCY MEDICINE

## 2020-01-13 PROCEDURE — 87147 CULTURE TYPE IMMUNOLOGIC: CPT | Performed by: EMERGENCY MEDICINE

## 2020-01-13 PROCEDURE — 99284 EMERGENCY DEPT VISIT MOD MDM: CPT | Performed by: EMERGENCY MEDICINE

## 2020-01-13 PROCEDURE — 87040 BLOOD CULTURE FOR BACTERIA: CPT | Performed by: EMERGENCY MEDICINE

## 2020-01-13 PROCEDURE — 71046 X-RAY EXAM CHEST 2 VIEWS: CPT

## 2020-01-13 PROCEDURE — 80320 DRUG SCREEN QUANTALCOHOLS: CPT | Performed by: EMERGENCY MEDICINE

## 2020-01-13 PROCEDURE — 36415 COLL VENOUS BLD VENIPUNCTURE: CPT | Performed by: EMERGENCY MEDICINE

## 2020-01-13 PROCEDURE — 93005 ELECTROCARDIOGRAM TRACING: CPT

## 2020-01-13 PROCEDURE — 99285 EMERGENCY DEPT VISIT HI MDM: CPT

## 2020-01-13 PROCEDURE — 80307 DRUG TEST PRSMV CHEM ANLYZR: CPT | Performed by: EMERGENCY MEDICINE

## 2020-01-13 PROCEDURE — 84484 ASSAY OF TROPONIN QUANT: CPT | Performed by: EMERGENCY MEDICINE

## 2020-01-13 PROCEDURE — 85025 COMPLETE CBC W/AUTO DIFF WBC: CPT | Performed by: EMERGENCY MEDICINE

## 2020-01-13 RX ORDER — LEVOFLOXACIN 5 MG/ML
750 INJECTION, SOLUTION INTRAVENOUS ONCE
Status: DISCONTINUED | OUTPATIENT
Start: 2020-01-13 | End: 2020-01-13

## 2020-01-13 RX ORDER — SPIRONOLACTONE 25 MG/1
25 TABLET ORAL DAILY
Status: DISCONTINUED | OUTPATIENT
Start: 2020-01-14 | End: 2020-01-22 | Stop reason: HOSPADM

## 2020-01-13 RX ORDER — TRAZODONE HYDROCHLORIDE 50 MG/1
25 TABLET ORAL
Status: CANCELLED | OUTPATIENT
Start: 2020-01-13

## 2020-01-13 RX ORDER — HYDROXYZINE HYDROCHLORIDE 25 MG/1
25 TABLET, FILM COATED ORAL EVERY 6 HOURS PRN
Status: CANCELLED | OUTPATIENT
Start: 2020-01-13

## 2020-01-13 RX ORDER — ISOSORBIDE MONONITRATE 30 MG/1
30 TABLET, EXTENDED RELEASE ORAL DAILY
Status: DISCONTINUED | OUTPATIENT
Start: 2020-01-14 | End: 2020-01-22 | Stop reason: HOSPADM

## 2020-01-13 RX ORDER — MAGNESIUM HYDROXIDE/ALUMINUM HYDROXICE/SIMETHICONE 120; 1200; 1200 MG/30ML; MG/30ML; MG/30ML
30 SUSPENSION ORAL EVERY 4 HOURS PRN
Status: DISCONTINUED | OUTPATIENT
Start: 2020-01-13 | End: 2020-01-22 | Stop reason: HOSPADM

## 2020-01-13 RX ORDER — HALOPERIDOL 0.5 MG/1
1 TABLET ORAL EVERY 8 HOURS PRN
Status: DISCONTINUED | OUTPATIENT
Start: 2020-01-13 | End: 2020-01-22 | Stop reason: HOSPADM

## 2020-01-13 RX ORDER — B-COMPLEX WITH VITAMIN C
1 TABLET ORAL DAILY
Status: DISCONTINUED | OUTPATIENT
Start: 2020-01-14 | End: 2020-01-22 | Stop reason: HOSPADM

## 2020-01-13 RX ORDER — DOCUSATE SODIUM 100 MG/1
100 CAPSULE, LIQUID FILLED ORAL 2 TIMES DAILY PRN
Status: DISCONTINUED | OUTPATIENT
Start: 2020-01-13 | End: 2020-01-22 | Stop reason: HOSPADM

## 2020-01-13 RX ORDER — LEVOFLOXACIN 750 MG/1
750 TABLET ORAL EVERY 24 HOURS
Status: DISCONTINUED | OUTPATIENT
Start: 2020-01-14 | End: 2020-01-14

## 2020-01-13 RX ORDER — HALOPERIDOL 1 MG/1
1 TABLET ORAL EVERY 8 HOURS PRN
Status: CANCELLED | OUTPATIENT
Start: 2020-01-13

## 2020-01-13 RX ORDER — MINERAL OIL AND PETROLATUM 150; 830 MG/G; MG/G
2 OINTMENT OPHTHALMIC 3 TIMES DAILY PRN
Status: DISCONTINUED | OUTPATIENT
Start: 2020-01-13 | End: 2020-01-22 | Stop reason: HOSPADM

## 2020-01-13 RX ORDER — ACETAMINOPHEN 325 MG/1
650 TABLET ORAL EVERY 4 HOURS PRN
Status: DISCONTINUED | OUTPATIENT
Start: 2020-01-13 | End: 2020-01-22 | Stop reason: HOSPADM

## 2020-01-13 RX ORDER — LEVOTHYROXINE SODIUM 0.07 MG/1
150 TABLET ORAL
Status: DISCONTINUED | OUTPATIENT
Start: 2020-01-14 | End: 2020-01-22 | Stop reason: HOSPADM

## 2020-01-13 RX ORDER — POTASSIUM CHLORIDE 750 MG/1
20 TABLET, EXTENDED RELEASE ORAL 2 TIMES DAILY WITH MEALS
Status: DISCONTINUED | OUTPATIENT
Start: 2020-01-13 | End: 2020-01-16

## 2020-01-13 RX ORDER — HYDROXYZINE HYDROCHLORIDE 25 MG/1
25 TABLET, FILM COATED ORAL EVERY 6 HOURS PRN
Status: DISCONTINUED | OUTPATIENT
Start: 2020-01-13 | End: 2020-01-18

## 2020-01-13 RX ORDER — BENZTROPINE MESYLATE 1 MG/1
1 TABLET ORAL EVERY 6 HOURS PRN
Status: CANCELLED | OUTPATIENT
Start: 2020-01-13

## 2020-01-13 RX ORDER — ACETAMINOPHEN 325 MG/1
650 TABLET ORAL EVERY 6 HOURS
Status: DISCONTINUED | OUTPATIENT
Start: 2020-01-13 | End: 2020-01-22 | Stop reason: HOSPADM

## 2020-01-13 RX ORDER — MAGNESIUM HYDROXIDE/ALUMINUM HYDROXICE/SIMETHICONE 120; 1200; 1200 MG/30ML; MG/30ML; MG/30ML
30 SUSPENSION ORAL EVERY 4 HOURS PRN
Status: CANCELLED | OUTPATIENT
Start: 2020-01-13

## 2020-01-13 RX ORDER — PRAVASTATIN SODIUM 20 MG
20 TABLET ORAL
Status: DISCONTINUED | OUTPATIENT
Start: 2020-01-13 | End: 2020-01-22 | Stop reason: HOSPADM

## 2020-01-13 RX ORDER — HALOPERIDOL 5 MG/ML
1 INJECTION INTRAMUSCULAR EVERY 8 HOURS PRN
Status: DISCONTINUED | OUTPATIENT
Start: 2020-01-13 | End: 2020-01-22 | Stop reason: HOSPADM

## 2020-01-13 RX ORDER — ACETAMINOPHEN 325 MG/1
650 TABLET ORAL EVERY 4 HOURS PRN
Status: CANCELLED | OUTPATIENT
Start: 2020-01-13

## 2020-01-13 RX ORDER — HALOPERIDOL 5 MG/ML
1 INJECTION INTRAMUSCULAR EVERY 8 HOURS PRN
Status: CANCELLED | OUTPATIENT
Start: 2020-01-13

## 2020-01-13 RX ORDER — FUROSEMIDE 40 MG/1
40 TABLET ORAL DAILY
Status: DISCONTINUED | OUTPATIENT
Start: 2020-01-14 | End: 2020-01-22 | Stop reason: HOSPADM

## 2020-01-13 RX ORDER — NITROFURANTOIN 25; 75 MG/1; MG/1
100 CAPSULE ORAL ONCE
Status: COMPLETED | OUTPATIENT
Start: 2020-01-13 | End: 2020-01-13

## 2020-01-13 RX ORDER — DIPHENHYDRAMINE HCL 25 MG
25 TABLET ORAL ONCE
Status: COMPLETED | OUTPATIENT
Start: 2020-01-13 | End: 2020-01-13

## 2020-01-13 RX ORDER — NITROGLYCERIN 0.4 MG/1
0.4 TABLET SUBLINGUAL
Status: DISCONTINUED | OUTPATIENT
Start: 2020-01-13 | End: 2020-01-22 | Stop reason: HOSPADM

## 2020-01-13 RX ORDER — GABAPENTIN 100 MG/1
100 CAPSULE ORAL 2 TIMES DAILY
Status: DISCONTINUED | OUTPATIENT
Start: 2020-01-13 | End: 2020-01-18

## 2020-01-13 RX ORDER — RISPERIDONE 0.5 MG/1
0.5 TABLET, ORALLY DISINTEGRATING ORAL EVERY 8 HOURS PRN
Status: DISCONTINUED | OUTPATIENT
Start: 2020-01-13 | End: 2020-01-18

## 2020-01-13 RX ORDER — TRAZODONE HYDROCHLORIDE 50 MG/1
25 TABLET ORAL
Status: DISCONTINUED | OUTPATIENT
Start: 2020-01-13 | End: 2020-01-14

## 2020-01-13 RX ORDER — LEVOFLOXACIN 750 MG/1
750 TABLET ORAL ONCE
Status: COMPLETED | OUTPATIENT
Start: 2020-01-13 | End: 2020-01-13

## 2020-01-13 RX ORDER — BENZTROPINE MESYLATE 1 MG/1
1 TABLET ORAL EVERY 6 HOURS PRN
Status: DISCONTINUED | OUTPATIENT
Start: 2020-01-13 | End: 2020-01-18

## 2020-01-13 RX ORDER — RISPERIDONE 0.5 MG/1
0.5 TABLET, ORALLY DISINTEGRATING ORAL EVERY 8 HOURS PRN
Status: CANCELLED | OUTPATIENT
Start: 2020-01-13

## 2020-01-13 RX ADMIN — POTASSIUM CHLORIDE 20 MEQ: 10 TABLET, EXTENDED RELEASE ORAL at 18:44

## 2020-01-13 RX ADMIN — DIPHENHYDRAMINE HCL 25 MG: 25 TABLET ORAL at 14:50

## 2020-01-13 RX ADMIN — TRAZODONE HYDROCHLORIDE 25 MG: 50 TABLET ORAL at 21:33

## 2020-01-13 RX ADMIN — NITROFURANTOIN (MONOHYDRATE/MACROCRYSTALS) 100 MG: 75; 25 CAPSULE ORAL at 14:50

## 2020-01-13 RX ADMIN — ACETAMINOPHEN 650 MG: 325 TABLET ORAL at 18:43

## 2020-01-13 RX ADMIN — LEVOFLOXACIN 750 MG: 750 TABLET, FILM COATED ORAL at 16:07

## 2020-01-13 RX ADMIN — GABAPENTIN 100 MG: 100 CAPSULE ORAL at 18:44

## 2020-01-13 RX ADMIN — PRAVASTATIN SODIUM 20 MG: 20 TABLET ORAL at 18:44
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
## 2020-01-13 NOTE — ED NOTES
Insurance Authorization for admission:   Phone call placed to [employer]) [place]   Phone number:[phone]  Spoke to Aziza Wahl   3* days approved  Level of care: inpatient mental health  Review on Wed 1/15  Authorization # [id number]    Ongoing reviewer is Koko Kruger at [phone]  X [phone]     EVS (Eligibility Verification System) called - [phone]    Automated system indicates: managed care Omid completed with [employer] at [phone]  Notified William Vang at [facility] of [address]

## 2020-01-13 NOTE — ED NOTES
Patient extremely uncomfortable in the [facility] top; put patient in hospital gown, all strings removed; ok'd by Dr Pineda Cast to be in gown with safety precautions in place      Jayesh Angel RN  01/13/20 [id number]

## 2020-01-13 NOTE — ED NOTES
Patient is accepted at [address] [facility] 6[facility]  Patient is accepted by Dr Geraldine Alba      Patient may go to the floor at after 1600      Nurse report is to be called to [phone] prior to patient transfer

## 2020-01-13 NOTE — ED NOTES
Pt adjusted in bed  "its so uncomfortable in this bed " sheet applied under the blankets per pt request         Olamide Sosa RN  01/13/20 142

## 2020-01-13 NOTE — ED PROVIDER NOTES
History  Chief Complaint   Patient presents with    Psychiatric Evaluation     pt from nursing home in [facility], family requested pt to be brought here to [facility] for psych eval  family states pt having increased depression, paranoia and hallucinations     81 yo female with a complicated past medical history including anxiety, depression, atrial fibrillation, DM, and HTN brought in by family for a psychiatric evaluation  The patient lives in a nursing facility in [facility]  Daughter says she has been experiencing increased depression, paranoia, and hallucinations (auditory and visual) over the past week  (+) Occasional vague SI without a plan  The patient has no complaints and is repeatedly requesting discharge  Prior to Admission Medications   Prescriptions Last Dose Informant Patient Reported? Taking? LORazepam (ATIVAN) 0 5 mg tablet   No No   Sig: Take 1 tablet by mouth every 6 (six) hours as needed for anxiety for up to 10 days   QUEtiapine (SEROquel) 25 mg tablet  Self No No   Sig: Take 1 tablet by mouth daily at bedtime for 30 days  Patient taking differently: Take 12 5 mg by mouth daily at bedtime     acetaminophen (TYLENOL) 325 mg tablet   No No   Sig: Take 2 tablets (650 mg total) by mouth every 6 (six) hours  artificial tear (LUBRIFRESH P M ) 83-15 % ophthalmic ointment   Yes No   Si application 3 (three) times a day as needed  calcium-vitamin D (OSCAL 500 + D) 500 mg-200 units per tablet   Yes No   Sig: Take 1 tablet by mouth daily  docusate sodium (COLACE) 100 mg capsule  Self Yes No   Sig: Take 100 mg by mouth every 8 (eight) hours   furosemide (LASIX) 40 mg tablet  Self Yes No   Sig: Take 40 mg by mouth daily   gabapentin (NEURONTIN) 100 mg capsule   No No   Sig: Take 1 capsule by mouth 2 (two) times a day for 30 days   hydrOXYzine HCL (ATARAX) 25 mg tablet   Yes No   Sig: Take 25 mg by mouth every 6 (six) hours as needed for itching     isosorbide mononitrate (IMDUR) 30 mg 24 hr tablet   No No   Sig: Take 1 tablet by mouth daily for 30 days   levothyroxine 150 mcg tablet   No No   Sig: Take 1 tablet by mouth daily in the early morning for 30 days   metoprolol tartrate (LOPRESSOR) 25 mg tablet   No No   Sig: Take 0 5 tablets by mouth daily for 30 days  nitroglycerin (NITROSTAT) 0 4 mg SL tablet   Yes No   Sig: Place 0 4 mg under the tongue every 5 (five) minutes as needed for chest pain  potassium chloride (K-DUR,KLOR-CON) 20 mEq tablet  Self No No   Sig: Take 1 tablet by mouth 2 (two) times a day with meals for 30 days  Patient taking differently: Take 20 mEq by mouth 2 (two) times a day     predniSONE 10 mg tablet   No No   Si mg PO daily for 7 days, then 30 mg daily for 7 days, then 20 mg daily for 7 days, then 10 mg daily for 7 days and stop   rivaroxaban (XARELTO) tablet   Yes No   Sig: Take 15 mg by mouth daily  sertraline (ZOLOFT) 50 mg tablet   No No   Sig: Take 1 tablet by mouth daily for 30 days  Patient taking differently: Take 100 mg by mouth daily     simvastatin (ZOCOR) 20 mg tablet   Yes No   Sig: Take 20 mg by mouth daily at bedtime  spironolactone (ALDACTONE) 25 mg tablet  Self No No   Sig: Take 1 tablet by mouth daily for 30 days  tiotropium (SPIRIVA) 18 mcg inhalation capsule   Yes No   Sig: Place 18 mcg into inhaler and inhale daily     traZODone (DESYREL) 50 mg tablet  Self Yes No   Sig: Take 50 mg by mouth daily at bedtime        Facility-Administered Medications: None       Past Medical History:   Diagnosis Date    Abdominal aortic aneurysm ([facility])     Angina pectoris ([facility])     Anginal pain ([facility])     Anxiety     Anxiety     Atrial fibrillation ([facility])     Blind     CHF (congestive heart failure) ([facility])     Chronic diastolic congestive heart failure ([place] [id number] ) 2016    Chronic indwelling Ha catheter     COPD (chronic obstructive pulmonary disease) ([facility])     Depressive disorder     Diabetes mellitus ([place] [address] )     Essential hypertension 2016  Fever 2016    Hypertension     Hypokalemia     Hypokalemia     Hypoxia 2016    Macular degeneration     Major depression     Muscle wasting and atrophy, not elsewhere classified, unspecified site     Myocardial infarction ([facility] )     Paroxysmal atrial fibrillation ([facility] ) 2016    Psychiatric disorder     Psychosis ([facility] )     Psychosis ([place] [address] )     Urinary retention     Urine retention     UTI (urinary tract infection) 2016       Past Surgical History:   Procedure Laterality Date    ABDOMINAL AORTIC ANEURYSM REPAIR      APPENDECTOMY      AZ BRONCHOSCOPY,DIAGNOSTIC N/A 3/20/2017    Procedure: BRONCHOSCOPY FLEXIBLE;  Surgeon: Baudilio Bender DO;  Location: [facility] OR;  Service: Pulmonary       Family History   Problem Relation Age of Onset    Heart disease Sister     Heart disease Brother      I have reviewed and agree with the history as documented  Social History     Tobacco Use    Smoking status: Former Smoker     Packs/day: 1 00     Start date:      Last attempt to quit:      Years since quittin 0    Smokeless tobacco: Never Used   Substance Use Topics    Alcohol use: Not Currently    Drug use: No        Review of Systems   Constitutional: Negative for chills and fever  HENT: Negative for sore throat  Eyes: Negative for visual disturbance  Respiratory: Negative for shortness of breath  Cardiovascular: Negative for chest pain  Gastrointestinal: Negative for abdominal pain, diarrhea and vomiting  Endocrine: Negative for cold intolerance and heat intolerance  Genitourinary: Negative for dysuria and frequency  Musculoskeletal: Negative for back pain  Skin: Negative for rash  Allergic/Immunologic: Negative for immunocompromised state  Neurological: Negative for weakness and numbness  Hematological: Negative for adenopathy  Psychiatric/Behavioral: Positive for dysphoric mood, hallucinations and suicidal ideas  Negative for self-injury   The patient is nervous/anxious  Physical Exam  Physical Exam   Constitutional: She is oriented to person, place, and time  She appears well-developed and well-nourished  No distress  HENT:   Head: Normocephalic and atraumatic  Eyes: Pupils are equal, round, and reactive to light  EOM are normal    Neck: Normal range of motion  Neck supple  Cardiovascular: Normal rate and regular rhythm  Pulmonary/Chest: Effort normal and breath sounds normal    Abdominal: Soft  She exhibits no distension  There is no tenderness  Musculoskeletal: Normal range of motion  She exhibits no edema  Neurological: She is alert and oriented to person, place, and time  Skin: Skin is warm and dry  Psychiatric: Her affect is angry  She is agitated and actively hallucinating  Thought content is paranoid  Cognition and memory are impaired  She expresses suicidal ideation  She expresses no homicidal ideation  She expresses no suicidal plans and no homicidal plans         Vital Signs  ED Triage Vitals   Temperature Pulse Respirations Blood Pressure SpO2   01/13/20 1251 01/13/20 1224 01/13/20 1224 01/13/20 1224 01/13/20 1224   98 1 °F (36 7 °C) 90 20 121/71 92 %      Temp Source Heart Rate Source Patient Position - Orthostatic VS BP Location FiO2 (%)   01/13/20 1251 01/13/20 1224 01/13/20 1224 01/13/20 1224 --   Tympanic Monitor Lying Left arm       Pain Score       --                  Vitals:    01/13/20 1224   BP: 121/71   Pulse: 90   Patient Position - Orthostatic VS: Lying         Visual Acuity      ED Medications  Medications   levofloxacin (LEVAQUIN) tablet 750 mg (has no administration in time range)   nitrofurantoin (MACROBID) extended-release capsule 100 mg (100 mg Oral Given 1/13/20 1450)   diphenhydrAMINE (BENADRYL) tablet 25 mg (25 mg Oral Given 1/13/20 1450)       Diagnostic Studies  Results Reviewed     Procedure Component Value Units Date/Time    Urine culture [[id number]]     Lab Status:  No result Specimen:  Urine Blood culture #1 [[id number]]     Lab Status:  No result Specimen:  Blood     Blood culture #2 [[id number]]     Lab Status:  No result Specimen:  Blood     Rapid drug screen, urine [[id number]]  (Normal) Collected:  01/13/20 1304    Lab Status:  Final result Specimen:  Urine, Catheter Updated:  01/13/20 1404     Amph/Meth UR Negative     Barbiturate Ur Negative     Benzodiazepine Urine Negative     Cocaine Urine Negative     Methadone Urine Negative     Opiate Urine Negative     PCP Ur Negative     THC Urine Negative    Narrative:       FOR MEDICAL PURPOSES ONLY  IF CONFIRMATION NEEDED PLEASE CONTACT THE LAB WITHIN 5 DAYS  Drug Screen Cutoff Levels:  AMPHETAMINE/METHAMPHETAMINES  1000 ng/mL  BARBITURATES     200 ng/mL  BENZODIAZEPINES     200 ng/mL  COCAINE      300 ng/mL  METHADONE      300 ng/mL  OPIATES      300 ng/mL  PHENCYCLIDINE     25 ng/mL  THC       50 ng/mL      TSH [[id number]]  (Normal) Collected:  01/13/20 1304    Lab Status:  Final result Specimen:  Blood from Arm, Right Updated:  01/13/20 1403     TSH 3RD GENERATON 0 524 uIU/mL     Narrative:       Patients undergoing fluorescein dye angiography may retain small amounts of fluorescein in the body for 48-72 hours post procedure  Samples containing fluorescein can produce falsely depressed TSH values  If the patient had this procedure,a specimen should be resubmitted post fluorescein clearance        Urine Microscopic [[id number]]  (Abnormal) Collected:  01/13/20 1304    Lab Status:  Final result Specimen:  Urine, Straight Cath Updated:  01/13/20 1402     RBC, UA 0-1 /hpf      WBC, UA Innumerable /hpf      Epithelial Cells Occasional /hpf      Bacteria, UA Innumerable /hpf     UA (URINE) with reflex to Scope [[id number]]  (Abnormal) Collected:  01/13/20 1304    Lab Status:  Final result Specimen:  Urine, Straight Cath Updated:  01/13/20 1347     Color, UA Yellow     Clarity, UA Cloudy     Specific [place], UA 1 010     pH, UA 6 5     Leukocytes,  0 Nitrite, UA Negative     Protein, UA 15 (Trace) mg/dl      Glucose, UA Negative mg/dl      Ketones, UA Negative mg/dl      Bilirubin, UA Negative     Blood, UA 25 0     UROBILINOGEN UA Negative mg/dL     Troponin I [[id number]]  (Normal) Collected:  01/13/20 1304    Lab Status:  Final result Specimen:  Blood from Arm, Right Updated:  01/13/20 1342     Troponin I <0 01 ng/mL     Comprehensive metabolic panel [[id number]]  (Abnormal) Collected:  01/13/20 1304    Lab Status:  Final result Specimen:  Blood from Arm, Right Updated:  01/13/20 1333     Sodium 139 mmol/L      Potassium 3 8 mmol/L      Chloride 99 mmol/L      CO2 30 mmol/L      ANION GAP 10 mmol/L      BUN 18 mg/dL      Creatinine 1 01 mg/dL      Glucose 98 mg/dL      Calcium 9 3 mg/dL      AST 19 U/L      ALT 20 U/L      Alkaline Phosphatase 63 U/L      Total Protein 7 9 g/dL      Albumin 3 9 g/dL      Total Bilirubin 0 60 mg/dL      eGFR 51 ml/min/1 73sq m     Narrative:       [place] guidelines for Chronic Kidney Disease (CKD):     Stage 1 with normal or high GFR (GFR > 90 mL/min/1 73 square meters)    Stage 2 Mild CKD (GFR = 60-89 mL/min/1 73 square meters)    Stage 3A Moderate CKD (GFR = 45-59 mL/min/1 73 square meters)    Stage 3B Moderate CKD (GFR = 30-44 mL/min/1 73 square meters)    Stage 4 Severe CKD (GFR = 15-29 mL/min/1 73 square meters)    Stage 5 End Stage CKD (GFR <15 mL/min/1 73 square meters)  Note: GFR calculation is accurate only with a steady state creatinine    Ethanol [[id number]]  (Normal) Collected:  01/13/20 1304    Lab Status:  Final result Specimen:  Blood from Arm, Right Updated:  01/13/20 1331     Ethanol Lvl <10 mg/dL     CBC and differential [[id number]]  (Abnormal) Collected:  01/13/20 1304    Lab Status:  Final result Specimen:  Blood from Arm, Right Updated:  01/13/20 1325     WBC 6 00 Thousand/uL      RBC 5 03 Million/uL      Hemoglobin 15 5 g/dL      Hematocrit 46 1 %      MCV 92 fL      MCH 30 8 pg      MCHC 33 7 g/dL      RDW 13 9 %      MPV 7 4 fL      Platelets 570 Thousands/uL      Neutrophils Relative 73 %      Lymphocytes Relative 16 %      Monocytes Relative 8 %      Eosinophils Relative 3 %      Basophils Relative 0 %      Neutrophils Absolute 4 40 Thousands/µL      Lymphocytes Absolute 0 90 Thousands/µL      Monocytes Absolute 0 50 Thousand/µL      Eosinophils Absolute 0 10 Thousand/µL      Basophils Absolute 0 00 Thousands/µL                  XR chest 2 views   Final Result by Merline Breslow, MD (01/13 0316)      Prominent patchy bilateral airspace disease in keeping with multilobar pneumonia  Patient has a history of an underlying amiodarone pulmonary toxicity; the chronic findings are obscured by the acute disease  Workstation performed: [id number]                    Procedures  Procedures         ED Course                               MDM  Number of Diagnoses or Management Options  Diagnosis management comments: The patient is agitated but otherwise well appearing  She has no appreciable complaints but family reports depression, paranoia, hallucinations, and suicidal thoughts  Will start medical screening examination and consult Crisis  Disposition per results and crisis worker  15:00 UA with UTI --> oral antibiotic administered  CXR suspicious for a multilobar pneumonia  The patient has no respiratory complaints  Will discuss disposition with SLIM and psychiatry  Blood cultures ordered  16:00 SLIM attending ok with patient going to [facility] psych on oral antibiotics  They will follow as an inpatient         Amount and/or Complexity of Data Reviewed  Clinical lab tests: ordered and reviewed  Tests in the radiology section of CPT®: ordered and reviewed  Tests in the medicine section of CPT®: reviewed and ordered    Patient Progress  Patient progress: stable        Disposition  Final diagnoses:   Depression with suicidal ideation   Pneumonia   UTI (urinary tract infection) Time reflects when diagnosis was documented in both MDM as applicable and the Disposition within this note     Time User Action Codes Description Comment    1/13/2020  4:01 PM Nolvia Cuellar Add [F32 9,  [id number]] Depression with suicidal ideation     1/13/2020  4:01 PM Nolvia Cuellar Add [J18 9] Pneumonia     1/13/2020  4:01 PM Lou Love Add [N39 0] UTI (urinary tract infection)       ED Disposition     None      MD Documentation      Most Recent Value   Accepting Physician  Dr Sisto Siemens Name, Toney Walsh      RN Documentation      [facility] Name, [address] [place] [address] 6T      Follow-up Information    None         Patient's Medications   Discharge Prescriptions    No medications on file     No discharge procedures on file      ED Provider  Electronically Signed by           Catalina Salguero MD  01/13/20 2086

## 2020-01-14 PROBLEM — G30.1 LATE ONSET ALZHEIMER'S DISEASE WITH BEHAVIORAL DISTURBANCE (HCC): Status: ACTIVE | Noted: 2020-01-14

## 2020-01-14 PROBLEM — F33.3 SEVERE EPISODE OF RECURRENT MAJOR DEPRESSIVE DISORDER, WITH PSYCHOTIC FEATURES (HCC): Status: ACTIVE | Noted: 2020-01-14

## 2020-01-14 PROBLEM — F02.81 LATE ONSET ALZHEIMER'S DISEASE WITH BEHAVIORAL DISTURBANCE (HCC): Status: ACTIVE | Noted: 2020-01-14

## 2020-01-14 PROBLEM — F03.90 DEMENTIA WITHOUT BEHAVIORAL DISTURBANCE (HCC): Chronic | Status: ACTIVE | Noted: 2020-01-14

## 2020-01-14 PROBLEM — N30.01 ACUTE CYSTITIS WITH HEMATURIA: Status: ACTIVE | Noted: 2020-01-14

## 2020-01-14 PROBLEM — J18.9 PNEUMONIA OF BOTH LUNGS DUE TO INFECTIOUS ORGANISM: Status: ACTIVE | Noted: 2020-01-14

## 2020-01-14 LAB
ALBUMIN SERPL BCP-MCNC: 3.4 G/DL (ref 3–5.2)
ALP SERPL-CCNC: 56 U/L (ref 43–122)
ALT SERPL W P-5'-P-CCNC: 11 U/L (ref 9–52)
ANION GAP SERPL CALCULATED.3IONS-SCNC: 9 MMOL/L (ref 5–14)
AST SERPL W P-5'-P-CCNC: 16 U/L (ref 14–36)
ATRIAL RATE: 357 BPM
BACTERIA UR CULT: ABNORMAL
BACTERIA UR CULT: ABNORMAL
BASOPHILS # BLD AUTO: 0 THOUSANDS/ΜL (ref 0–0.1)
BASOPHILS NFR BLD AUTO: 0 % (ref 0–1)
BILIRUB SERPL-MCNC: 0.6 MG/DL
BUN SERPL-MCNC: 16 MG/DL (ref 5–25)
CALCIUM SERPL-MCNC: 9.1 MG/DL (ref 8.4–10.2)
CHLORIDE SERPL-SCNC: 102 MMOL/L (ref 97–108)
CHOLEST SERPL-MCNC: 139 MG/DL
CO2 SERPL-SCNC: 28 MMOL/L (ref 22–30)
CREAT SERPL-MCNC: 0.96 MG/DL (ref 0.6–1.2)
EOSINOPHIL # BLD AUTO: 0.1 THOUSAND/ΜL (ref 0–0.4)
EOSINOPHIL NFR BLD AUTO: 3 % (ref 0–6)
ERYTHROCYTE [DISTWIDTH] IN BLOOD BY AUTOMATED COUNT: 13.8 %
GFR SERPL CREATININE-BSD FRML MDRD: 54 ML/MIN/1.73SQ M
GLUCOSE P FAST SERPL-MCNC: 99 MG/DL (ref 70–99)
GLUCOSE SERPL-MCNC: 99 MG/DL (ref 70–99)
HCT VFR BLD AUTO: 44.2 % (ref 36–46)
HDLC SERPL-MCNC: 20 MG/DL
HGB BLD-MCNC: 14.8 G/DL (ref 12–16)
LDLC SERPL CALC-MCNC: 86 MG/DL
LYMPHOCYTES # BLD AUTO: 1.3 THOUSANDS/ΜL (ref 0.5–4)
LYMPHOCYTES NFR BLD AUTO: 23 % (ref 25–45)
MCH RBC QN AUTO: 30.5 PG (ref 26.8–34.3)
MCHC RBC AUTO-ENTMCNC: 33.5 G/DL (ref 31.4–37.4)
MCV RBC AUTO: 91 FL (ref 80–100)
MONOCYTES # BLD AUTO: 0.6 THOUSAND/ΜL (ref 0.2–0.9)
MONOCYTES NFR BLD AUTO: 10 % (ref 1–10)
NEUTROPHILS # BLD AUTO: 3.6 THOUSANDS/ΜL (ref 1.8–7.8)
NEUTS SEG NFR BLD AUTO: 64 % (ref 45–65)
NONHDLC SERPL-MCNC: 119 MG/DL
PLATELET # BLD AUTO: 181 THOUSANDS/UL (ref 150–450)
PMV BLD AUTO: 7.6 FL (ref 8.9–12.7)
POTASSIUM SERPL-SCNC: 3.7 MMOL/L (ref 3.6–5)
PROT SERPL-MCNC: 6.9 G/DL (ref 5.9–8.4)
QRS AXIS: 21 DEGREES
QRSD INTERVAL: 76 MS
QT INTERVAL: 404 MS
QTC INTERVAL: 488 MS
RBC # BLD AUTO: 4.84 MILLION/UL (ref 4–5.2)
RPR SER QL: NORMAL
SODIUM SERPL-SCNC: 139 MMOL/L (ref 137–147)
T WAVE AXIS: 17 DEGREES
TRIGL SERPL-MCNC: 165 MG/DL
TSH SERPL DL<=0.05 MIU/L-ACNC: 0.74 UIU/ML (ref 0.47–4.68)
VENTRICULAR RATE: 88 BPM
WBC # BLD AUTO: 5.6 THOUSAND/UL (ref 4.31–10.16)

## 2020-01-14 PROCEDURE — 94664 DEMO&/EVAL PT USE INHALER: CPT

## 2020-01-14 PROCEDURE — 93010 ELECTROCARDIOGRAM REPORT: CPT | Performed by: INTERNAL MEDICINE

## 2020-01-14 PROCEDURE — 94640 AIRWAY INHALATION TREATMENT: CPT

## 2020-01-14 PROCEDURE — 94760 N-INVAS EAR/PLS OXIMETRY 1: CPT

## 2020-01-14 PROCEDURE — 85025 COMPLETE CBC W/AUTO DIFF WBC: CPT | Performed by: PSYCHIATRY & NEUROLOGY

## 2020-01-14 PROCEDURE — NC001 PR NO CHARGE

## 2020-01-14 PROCEDURE — 86592 SYPHILIS TEST NON-TREP QUAL: CPT | Performed by: PSYCHIATRY & NEUROLOGY

## 2020-01-14 PROCEDURE — 99222 1ST HOSP IP/OBS MODERATE 55: CPT | Performed by: FAMILY MEDICINE

## 2020-01-14 PROCEDURE — 80053 COMPREHEN METABOLIC PANEL: CPT | Performed by: PSYCHIATRY & NEUROLOGY

## 2020-01-14 PROCEDURE — 99222 1ST HOSP IP/OBS MODERATE 55: CPT | Performed by: PSYCHIATRY & NEUROLOGY

## 2020-01-14 PROCEDURE — 92610 EVALUATE SWALLOWING FUNCTION: CPT

## 2020-01-14 PROCEDURE — 80061 LIPID PANEL: CPT | Performed by: PSYCHIATRY & NEUROLOGY

## 2020-01-14 PROCEDURE — 84443 ASSAY THYROID STIM HORMONE: CPT | Performed by: PSYCHIATRY & NEUROLOGY

## 2020-01-14 PROCEDURE — 93005 ELECTROCARDIOGRAM TRACING: CPT

## 2020-01-14 RX ORDER — TRAZODONE HYDROCHLORIDE 50 MG/1
25 TABLET ORAL
Status: DISCONTINUED | OUTPATIENT
Start: 2020-01-14 | End: 2020-01-18

## 2020-01-14 RX ORDER — TRAZODONE HYDROCHLORIDE 50 MG/1
50 TABLET ORAL
Status: DISCONTINUED | OUTPATIENT
Start: 2020-01-14 | End: 2020-01-14

## 2020-01-14 RX ORDER — LEVOFLOXACIN 750 MG/1
750 TABLET ORAL EVERY 24 HOURS
Status: COMPLETED | OUTPATIENT
Start: 2020-01-14 | End: 2020-01-17

## 2020-01-14 RX ORDER — LEVALBUTEROL INHALATION SOLUTION 1.25 MG/3ML
1.25 SOLUTION RESPIRATORY (INHALATION) EVERY 8 HOURS PRN
Status: DISCONTINUED | OUTPATIENT
Start: 2020-01-14 | End: 2020-01-14 | Stop reason: SDUPTHER

## 2020-01-14 RX ORDER — LEVALBUTEROL 1.25 MG/.5ML
1.25 SOLUTION, CONCENTRATE RESPIRATORY (INHALATION) EVERY 8 HOURS PRN
Status: DISCONTINUED | OUTPATIENT
Start: 2020-01-14 | End: 2020-01-22 | Stop reason: HOSPADM

## 2020-01-14 RX ORDER — ARIPIPRAZOLE 2 MG/1
2 TABLET ORAL DAILY
Status: DISCONTINUED | OUTPATIENT
Start: 2020-01-14 | End: 2020-01-17

## 2020-01-14 RX ORDER — LEVALBUTEROL 1.25 MG/.5ML
SOLUTION, CONCENTRATE RESPIRATORY (INHALATION)
Status: COMPLETED
Start: 2020-01-14 | End: 2020-01-14

## 2020-01-14 RX ADMIN — POTASSIUM CHLORIDE 20 MEQ: 10 TABLET, EXTENDED RELEASE ORAL at 09:53

## 2020-01-14 RX ADMIN — GABAPENTIN 100 MG: 100 CAPSULE ORAL at 09:55

## 2020-01-14 RX ADMIN — ACETAMINOPHEN 650 MG: 325 TABLET ORAL at 23:41

## 2020-01-14 RX ADMIN — POTASSIUM CHLORIDE 20 MEQ: 10 TABLET, EXTENDED RELEASE ORAL at 17:20

## 2020-01-14 RX ADMIN — LEVALBUTEROL HYDROCHLORIDE 1.25 MG: 1.25 SOLUTION, CONCENTRATE RESPIRATORY (INHALATION) at 03:43

## 2020-01-14 RX ADMIN — GABAPENTIN 100 MG: 100 CAPSULE ORAL at 17:20

## 2020-01-14 RX ADMIN — SPIRONOLACTONE 25 MG: 25 TABLET ORAL at 09:53

## 2020-01-14 RX ADMIN — LEVOTHYROXINE SODIUM 150 MCG: 75 TABLET ORAL at 06:25

## 2020-01-14 RX ADMIN — TRAZODONE HYDROCHLORIDE 25 MG: 50 TABLET ORAL at 21:22

## 2020-01-14 RX ADMIN — SERTRALINE HYDROCHLORIDE 50 MG: 50 TABLET ORAL at 13:52

## 2020-01-14 RX ADMIN — HYDROXYZINE HYDROCHLORIDE 25 MG: 25 TABLET ORAL at 00:48

## 2020-01-14 RX ADMIN — TIOTROPIUM BROMIDE 18 MCG: 18 CAPSULE ORAL; RESPIRATORY (INHALATION) at 13:53

## 2020-01-14 RX ADMIN — PRAVASTATIN SODIUM 20 MG: 20 TABLET ORAL at 17:20

## 2020-01-14 RX ADMIN — ACETAMINOPHEN 650 MG: 325 TABLET ORAL at 13:52

## 2020-01-14 RX ADMIN — METOPROLOL TARTRATE 12.5 MG: 25 TABLET ORAL at 09:53

## 2020-01-14 RX ADMIN — OYSTER SHELL CALCIUM WITH VITAMIN D 1 TABLET: 500; 200 TABLET, FILM COATED ORAL at 09:53

## 2020-01-14 RX ADMIN — ACETAMINOPHEN 650 MG: 325 TABLET ORAL at 06:26

## 2020-01-14 RX ADMIN — ACETAMINOPHEN 650 MG: 325 TABLET ORAL at 17:30

## 2020-01-14 RX ADMIN — LEVOFLOXACIN 750 MG: 750 TABLET, FILM COATED ORAL at 21:22

## 2020-01-14 RX ADMIN — RIVAROXABAN 15 MG: 15 TABLET, FILM COATED ORAL at 10:04

## 2020-01-14 RX ADMIN — IPRATROPIUM BROMIDE 0.5 MG: 0.5 SOLUTION RESPIRATORY (INHALATION) at 03:43

## 2020-01-14 RX ADMIN — ISOSORBIDE MONONITRATE 30 MG: 30 TABLET, EXTENDED RELEASE ORAL at 09:52

## 2020-01-14 RX ADMIN — FUROSEMIDE 40 MG: 40 TABLET ORAL at 09:53

## 2020-01-14 RX ADMIN — ACETAMINOPHEN 650 MG: 325 TABLET ORAL at 00:48

## 2020-01-14 RX ADMIN — ARIPIPRAZOLE 2 MG: 2 TABLET ORAL at 13:52

## 2020-01-14 RX ADMIN — LEVALBUTEROL HYDROCHLORIDE 1.25 MG: 1.25 SOLUTION RESPIRATORY (INHALATION) at 03:44

## 2020-01-14 NOTE — PLAN OF CARE
Problem: Potential for Falls  Goal: Patient will remain free of falls  Description  INTERVENTIONS:  - Assess patient frequently for physical needs  -  Identify cognitive and physical deficits and behaviors that affect risk of falls    -  [place] fall precautions as indicated by assessment   - Educate patient/family on patient safety including physical limitations  - Instruct patient to call for assistance with activity based on assessment  - Modify environment to reduce risk of injury  - Consider OT/PT consult to assist with strengthening/mobility  Outcome: Progressing     Problem: Prexisting or High Potential for Compromised Skin Integrity  Goal: Skin integrity is maintained or improved  Description  INTERVENTIONS:  - Identify patients at risk for skin breakdown  - Assess and monitor skin integrity  - Assess and monitor nutrition and hydration status  - Monitor labs   - Assess for incontinence   - Turn and reposition patient  - Assist with mobility/ambulation  - Relieve pressure over bony prominences  - Avoid friction and shearing  - Provide appropriate hygiene as needed including keeping skin clean and dry  - Evaluate need for skin moisturizer/barrier cream  - Collaborate with interdisciplinary team   - Patient/family teaching  - Consider wound care consult   Outcome: Progressing     Problem: DEPRESSION  Goal: Will be euthymic at discharge  Description  INTERVENTIONS:  - Administer medication as ordered  - Provide emotional support via 1:1 interaction with staff  - Encourage involvement in milieu/groups/activities  - Monitor for social isolation  Outcome: Progressing     Problem: Alteration in Orientation  Goal: Treatment Goal: Demonstrate a reduction of confusion and improved orientation to person, place, time and/or situation upon discharge, according to optimum baseline/ability  Outcome: Progressing

## 2020-01-14 NOTE — ASSESSMENT & PLAN NOTE
Patient was admitted for dementia with hallucinations and worsening behavior  I do not know if the UTI and the pneumonia have anything to do with it  She does not appear to be acutely septic or toxic at this time  Will treat with a 5 day course of Levaquin    Further as per psych

## 2020-01-14 NOTE — CONSULTS
Consult- Randy Sosa 1934, 80 y o  female MRN: [id number]    Unit/Bed#: [facility] Montes 141-08 Encounter: [id number]    Primary Care Provider: Samira De Souza DO   Date and time admitted to hospital: 1/13/2020 12:18 PM      Inpatient consult for Medical Clearance for [address] patient  Consult performed by: Chelly Campbell MD  Consult ordered by: Ashvin Johnson MD      Patient is on Xarelto for anticoagulation    Late onset Alzheimer's disease with behavioral disturbance [facility])  Assessment & Plan  Patient was admitted for dementia with hallucinations and worsening behavior  I do not know if the UTI and the pneumonia have anything to do with it  She does not appear to be acutely septic or toxic at this time  Will treat with a 5 day course of Levaquin  Further as per psych    Acute cystitis with hematuria  Assessment & Plan  Patient has acute UTI present on admission  Currently on Levaquin for pneumonia which should hopefully cover the UTI as well  Await urine culture result    Pneumonia of both lungs due to infectious organism  Assessment & Plan  Continue 5 day course of Levaquin  Watch for aspiration  Patient has multilobar pneumonia which I feel is probably aspiration related  Will ask speech therapist for evaluation    Will need follow-up chest x-ray in 4 weeks    COPD (chronic obstructive pulmonary disease) ([facility] [address] )  Assessment & Plan  Controlled at this time not in exacerbation  Continue Spiriva, levalbuterol and ipratropium    Essential hypertension  Assessment & Plan  Patient's blood pressure is well controlled on current antihypertensive regimen    Morbid obesity (HCC)  Assessment & Plan  Will need counseling on diet exercise and lifestyle modification    Chronic diastolic congestive heart failure (HCC)  Assessment & Plan  Wt Readings from Last 3 Encounters:   01/13/20 116 kg (254 lb 15 7 oz)   10/10/18 119 kg (262 lb)   09/26/17 115 kg (254 lb)         Patient has known history of chronic diastolic CHF  Currently she is euvolemic  Continue Lasix 40 mg daily  Continue medical management    Permanent atrial fibrillation  Assessment & Plan  Patient EKG on admission shows AFib  She probably has permanent AFib  Cont metoprolol for rate control  Not on any anticoagulation      History of amiodarone toxicity:  Continue nebulizer treatments  VTE Prophylaxis: Sequential compression device (Venodyne)   / sequential compression device   On Xarelto for anticoagulation  Recommendations for Discharge:  · Chest x-ray in 4 weeks    Counseling / Coordination of Care Time: 1 hour  Greater than 50% of total time spent on patient counseling and coordination of care  Collaboration of Care: Were Recommendations Directly Discussed with Primary Treatment Team? - Yes     History of Present Illness:    Juliana Light is a 80 y o  female who is originally admitted to the psychiatry service due to hallucinations and worsening behavior disturbance  We are consulted for medical management  Patient was sent to the behavioral health unit for evaluation due to recent worsening hallucinations  She denies any chest pain or shortness of breath or abdominal pain or dysuria at this time  She is eating her lunch and does not appear to be in any distress    Review of Systems:    Review of Systems   Constitutional: Positive for appetite change and fatigue  Negative for chills and fever  HENT: Negative for hearing loss, sore throat and trouble swallowing  Eyes: Negative for photophobia, discharge and visual disturbance  Respiratory: Negative for chest tightness and shortness of breath  Cardiovascular: Negative for chest pain and palpitations  Gastrointestinal: Negative for abdominal pain, blood in stool and vomiting  Endocrine: Negative for polydipsia and polyuria  Genitourinary: Negative for difficulty urinating, dysuria, flank pain and hematuria  Musculoskeletal: Negative for back pain and gait problem     Skin: Negative for rash  Allergic/Immunologic: Negative for environmental allergies and food allergies  Neurological: Negative for dizziness, seizures, syncope and headaches  Hematological: Does not bruise/bleed easily  Psychiatric/Behavioral: Positive for behavioral problems  The patient is nervous/anxious  All other systems reviewed and are negative  Past Medical and Surgical History:     Past Medical History:   Diagnosis Date    Abdominal aortic aneurysm (HCC)     Angina pectoris (HCC)     Anginal pain (HCC)     Anxiety     Anxiety     Atrial fibrillation (HCC)     Blind     [facility]     Chronic diastolic congestive heart failure (HCC) 7/25/2016    Chronic indwelling Ha catheter     COPD (chronic obstructive pulmonary disease) ([facility]     Depressive disorder     Diabetes mellitus ([facility] [address] )     Essential hypertension 7/25/2016    Fever 9/12/2016    Hypertension     Hypokalemia     Hypokalemia     Hypoxia 9/12/2016    Macular degeneration     Major depression     Muscle wasting and atrophy, not elsewhere classified, unspecified site     Myocardial infarction ([facility] [address] )     Paroxysmal atrial fibrillation ([facility] [address] ) 7/24/2016    Psychiatric disorder     Psychosis ([facility] [address] )     Psychosis ([facility] [address] )     Urinary retention     Urine retention     UTI (urinary tract infection) 9/12/2016       Past Surgical History:   Procedure Laterality Date    ABDOMINAL AORTIC ANEURYSM REPAIR      APPENDECTOMY      IA BRONCHOSCOPY,DIAGNOSTIC N/A 3/20/2017    Procedure: BRONCHOSCOPY FLEXIBLE;  Surgeon: Stan Ge DO;  Location:  MAIN OR;  Service: Pulmonary       Meds/Allergies:    all medications and allergies reviewed    Allergies: Allergies   Allergen Reactions    Vancomycin Rash    Iodinated Diagnostic Agents     Other      dyes    Cefepime Rash       Social History:     Marital Status:      Substance Use History:   Social History     Substance and Sexual Activity   Alcohol Use Not Currently     Social History     Tobacco Use   Smoking Status Former Smoker    Packs/day: 1 00    Start date: 46    Last attempt to quit: 1982    Years since quittin 0   Smokeless Tobacco Never Used     Social History     Substance and Sexual Activity   Drug Use No       Family History:    Family History   Problem Relation Age of Onset    Heart disease Sister     Heart disease Brother        Physical Exam:     Vitals:   Blood Pressure: 111/65 (20)  Pulse: 86 (20)  Temperature: 99 2 °F (37 3 °C) (20)  Temp Source: Oral (20)  Respirations: 20 (20)  Height: 5' 9" (175 3 cm) (20)  Weight - Scale: 116 kg (254 lb 15 7 oz) (20)  SpO2: 98 % (20)    Physical Exam   Constitutional: She appears well-developed and well-nourished  HENT:   Head: Normocephalic and atraumatic  Right Ear: External ear normal    Left Ear: External ear normal    Mouth/Throat: Oropharynx is clear and moist    Eyes: Conjunctivae and EOM are normal    Neck: Normal range of motion  Neck supple  Cardiovascular: Normal heart sounds  Irregularly irregular   Pulmonary/Chest: Effort normal    Moderate air entry bilaterally with mild decreased breath sounds bilateral bases   Abdominal: Soft  Bowel sounds are normal  She exhibits no mass  There is no tenderness  There is no rebound and no guarding  Genitourinary:   Genitourinary Comments: deferred   Musculoskeletal: She exhibits edema  Neurological: She is alert  She has normal reflexes  Cranial nerves 2-12 are normal   Normal neurological exam  Oriented to person and place   Skin: Skin is warm and dry  No rash noted  Psychiatric: She has a normal mood and affect  Nursing note and vitals reviewed  Additional Data:     Lab Results: I have personally reviewed pertinent reports        Results from last 7 days   Lab Units 20  0644   WBC Thousand/uL 5 60   HEMOGLOBIN g/dL 14 8 HEMATOCRIT % 44 2   PLATELETS Thousands/uL 181   NEUTROS PCT % 64   LYMPHS PCT % 23*   MONOS PCT % 10   EOS PCT % 3     Results from last 7 days   Lab Units 01/14/20  0644   SODIUM mmol/L 139   POTASSIUM mmol/L 3 7   CHLORIDE mmol/L 102   CO2 mmol/L 28   BUN mg/dL 16   CREATININE mg/dL 0 96   ANION GAP mmol/L 9   CALCIUM mg/dL 9 1   ALBUMIN g/dL 3 4   TOTAL BILIRUBIN mg/dL 0 60   ALK PHOS U/L 56   ALT U/L 11   AST U/L 16   GLUCOSE RANDOM mg/dL 99         Results from last 7 days   Lab Units 01/13/20  1304   TROPONIN I ng/mL <0 01     Lab Results   Component Value Date/Time    HGBA1C 5 4 03/21/2017 05:59 AM               Imaging: I have personally reviewed pertinent reports  XR chest 2 views   Final Result by Tyesha Snow MD (01/13 7696)      Prominent patchy bilateral airspace disease in keeping with multilobar pneumonia  Patient has a history of an underlying amiodarone pulmonary toxicity; the chronic findings are obscured by the acute disease  Workstation performed: [id number]             EKG, Pathology, and Other Studies Reviewed on Admission:   · EKG:  AFib    ** Please Note: This note has been constructed using a voice recognition system   **
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
## 2020-01-14 NOTE — SPEECH THERAPY NOTE
Speech-Language Pathology Bedside Swallow Evaluation      Patient Name: Cornel Osler    PNTME'R Date: 1/14/2020     Problem List  Principal Problem:    Severe episode of recurrent major depressive disorder, with psychotic features ([facility] )  Active Problems:    Permanent atrial fibrillation    Chronic diastolic congestive heart failure ([facility] )    Morbid obesity ([facility] )    Essential hypertension    COPD (chronic obstructive pulmonary disease) ([facility] )    Late onset Alzheimer's disease with behavioral disturbance ([facility] )    Pneumonia of both lungs due to infectious organism    Acute cystitis with hematuria      Past Medical History  Past Medical History:   Diagnosis Date    Abdominal [facility] aneurysm ([facility])     Angina pectoris ([facility])     Anginal pain ([facility])     Anxiety     Anxiety     Atrial fibrillation ([facility] )     Blind     CHF (congestive heart failure) ([facility])     Chronic diastolic congestive heart failure ([facility]) 7/25/2016    Chronic indwelling Ha catheter     COPD (chronic obstructive pulmonary disease) ([facility])     Depressive disorder     Diabetes mellitus ([facility] )     Essential hypertension 7/25/2016    Fever 9/12/2016    Hypertension     Hypokalemia     Hypokalemia     Hypoxia 9/12/2016    Macular degeneration     Major depression     Muscle wasting and atrophy, not elsewhere classified, unspecified site     Myocardial infarction ([facility] )     Paroxysmal atrial fibrillation ([facility] ) 7/24/2016    Psychiatric disorder     Psychosis ([facility] )     Psychosis ([facility] )     Urinary retention     Urine retention     UTI (urinary tract infection) 9/12/2016       Past Surgical History  Past Surgical History:   Procedure Laterality Date    ABDOMINAL AORTIC ANEURYSM REPAIR      APPENDECTOMY      IL BRONCHOSCOPY,DIAGNOSTIC N/A 3/20/2017    Procedure: BRONCHOSCOPY FLEXIBLE;  Surgeon: Brian Singh DO;  Location:  MAIN OR;  Service: Pulmonary       Summary   Pt presented with functional appearing pharyngeal stage swallowing skills with materials administered today (applesauce, buttered raisin bread and moistened cookies)  She presented without her dentures and mastication appropriately prolonged  Risk/s for Aspiration: appears to be minimal     Recommended Diet: Level 3 dysphagia diet to begin (as pt without dentures and THIN LIQUID   (versus  regular diet and thin liquids (but please select soft well cooked foods until dentures are obtained)  Recommended Form of Meds: as desired and tolerated         Current Medical Status  Asya Randle is a 80 y o  Margarie Hammers a complicated past medical history including anxiety, depression, atrial fibrillation, DM, and HTN  Daughter says she has been experiencing increased depression, paranoia, and hallucinations (auditory and visual) over the past week at her SNF in [facility]  She was admitted to [facility] 1/13 due to hallucinations and worsening behavior disturbance  DX: dementia with behavioral disturbance, acute cystitis with hematuria, multilobar pneumonia  Swallowing Evaluation requested at this time  Current Precautions: Fall    Past medical history:Please see H&P for details    Special Studies:  CXR 1/13: Prominent patchy bilateral airspace disease in keeping with multilobar pneumonia  Patient has a history of an underlying amiodarone pulmonary toxicity; the chronic findings are obscured by the acute disease      Social/Education/Vocational Hx:  Pt lives in SNF[facility], [facility] as well)    Swallow Information   Current Risks for Dysphagia & Aspiration: AMS  Current Symptoms/Concerns: current multilobar pneumonia  Current Diet: regular diet and thin liquids   Baseline Diet: regular diet and thin liquids      Baseline Assessment   Behavior/Cognition: alert  Speech/Language Status: able to follow commands and produced fluent well articulated speech (content confused at times)  Patient Positioning: upright in chair  Pain Status/Interventions/Response to Interventions:  No report of or nonverbal indications of pain  Swallow Mechanism Exam  Facial: symmetrical  Labial: WFL  Lingual: WFL  Velum: symmetrical but ?fullness in area of R tonsil  Mandible: adequate ROM  Dentition: without her dentures  Vocal quality:clear/adequate   Respiratory Status: on RA       Consistencies Assessed and Performance   Consistencies Administered: thin liquids, puree, soft solids and hard solids  Materials administered included juice, applesauce, buttered raisin bread and hard cookies    Oral Stage: WFL c soft solid food but mildly impaired with hard solid food (given pt without dentures)  Mastication was prolonged with hard solid food but adequate with all materials given time  Bolus formation and transfer were functional with no significant oral residue noted  No overt s/s reduced oral control  Pharyngeal Stage: WFL suspected  Swallow Mechanics:  Swallowing initiation appeared prompt  Laryngeal rise was palpated and judged to be within functional limits  No coughing, throat clearing, change in vocal quality or respiratory status noted today       Esophageal Concerns: none reported    Strategies and Efficacy: pt asked to "dunk" hard cookies    Summary and Recommendations (see above)    Results Reviewed with: patient and RN    Treatment Recommended: none at this time

## 2020-01-14 NOTE — DISCHARGE INSTR - OTHER ORDERS
You are being discharged to:  [facility]  [address] [place], [place] [address]     Triggers you have identified during your hospitalization that led to your suicidal ideation and distressed mood include: increased depression, paranoia, and hallucinations  Coping skills you have identified during your hospitalization include: spending time with your family  If you are unable to deal with your distressed mood alone, please contact staff at your facility  If that is not effective and you continue to have suicidal ideation, a distressed mood, or feel like you're in crisis, please contact 911 and go to the nearest hospital     If you are in a Crisis situation Call [phone] to speak to a trained crisis worker - Anytime - Day or Night  You may also call one of the numbers below:  [employer]     [facility]:   [address] [facility]:  [address] [place] [address] Rd , [address] on [address] [facility] HELPLINE: [phone]/Website: www kd org  *Substance Abuse and [address] [employer], which is a confidential, free, 24-hour-a-day, 365-day-a-year, information service for individuals and family members facing mental health and/or substance use disorders  This service provides referrals to local treatment facilities, support groups, and community-based organizations  Callers can also order free publications and other information  Call 7-735.791.3246/Website: www [facility] gov  *[facility] 2-1-1: This is a toll free, confidential, 24-hour-a-day service which connects you to a community  in your area who can help you find services and resources that are available to you locally and provide critical services that can improve and save lives  Call: 211  /Website: https://mellissa-angelika net/      Follow-up provided by [employer] once you return to [facility]   Recommended: individual psychotherapy and ongoing medication management  Wound Care Plan:   1-Nystatin powder to bilateral groin folds twice per day  2-Apply thin layer of Calazime paste to bilateral buttocks and sacrum three times per day and as needed with any incontinence care  3-Keep skin folds clean and dry  Skin care plans:  1-Hydraguard lotion to bilateral heels twice per day for skin protection  2-Elevate heels to offload pressure  3-Offloading (waffle) cushion in chair when out of bed  4-Moisturize skin daily with lotion  5-Turn/reposition every 2 hours while in bed and weight shift frequently while in chair for pressure re-distribution on skin  6-Elevate bilateral lower extremities as often as possible to decrease swelling

## 2020-01-14 NOTE — ASSESSMENT & PLAN NOTE
Patient has acute UTI present on admission  Currently on Levaquin for pneumonia which should hopefully cover the UTI as well    Await urine culture result

## 2020-01-14 NOTE — NURSING NOTE
Pt called out for staff because she had difficulty breathing  Pt RR-24, O2 SAT 89-93%  Started pt on 2LO2 via nasal canula  Repositioned her in bed  Notified FAUSTINA who came and evaluated her  Breathing treatments ordered  Notified Respiratory for same

## 2020-01-14 NOTE — PROGRESS NOTES
01/14/20 1000 01/14/20 1100 01/14/20 1400   Activity/Group Checklist   Group Other (Comment)  ( education) Exercise  (seated musical hand exercises ) Life Skills  (letting go task )   Attendance Attended Attended Attended   Attendance Duration (min) 31-45 16-30  --    Interactions Did not interact Did not interact Did not interact   Affect/Mood Blunted/flat Calm  --    Goals Achieved Identified feelings; Discussed coping strategies; Able to listen to others Able to listen to others  --

## 2020-01-14 NOTE — TREATMENT PLAN
TREATMENT PLAN REVIEW - [address] 80 y o  1934 female MRN: [id number]    [address] Room / Bed: Alize Amezcua [facility] [id number] Encounter: [id number]          Admit Date/Time:  1/13/2020 12:18 PM    Treatment Team: Attending Provider: Marcin Lira MD; Patient Care Assistant: Johnny Del Rio; Registered Nurse: Radha Urban RN; Registered Nurse: Kitty Pickard RN; Occupational Therapy Assistant: Maximus Ochoa; Occupational Therapist: Cyril Rivera OT; : Mina Nolan; Care Coordinator: Alma Rodriguez; Certified Nursing Assistant: Octavio Adrian; Patient Care Technician: Joy Matthews    Diagnosis: Active Problems:    * No active hospital problems   *      Patient Strengths/Assets:  Stable living environment-strong family ties    Patient Barriers/Limitations: impaired cognition    Short Term Goals:  Diminished depressive symptoms and anxiety-diminished frequency of hallucinations-adjust medications with minimal side effects    Long Term Goals: improvement in depression, free of suicidal thoughts, improvement in reality testing, improved insight, able to express basic needs, acceptance of need for psychiatric medications, acceptance of need for psychiatric treatment, adequate sleep, adequate appetite, adequate oral intake    Progress Towards Goals: starting psychiatric medications as prescribed    Recommended Treatment: medication management, patient medication education, group therapy, milieu therapy, continued Behavioral Health psychiatric evaluation/assessment process    Treatment Frequency: daily medication monitoring, group and milieu therapy daily, monitoring through interdisciplinary rounds, monitoring through weekly patient care conferences    Expected Discharge Date:  7 days    Discharge Plan: return to previous living arrangement    Treatment Plan Created/Updated By: Marcin Lira MD

## 2020-01-14 NOTE — ASSESSMENT & PLAN NOTE
Patient EKG on admission shows AFib  She probably has permanent AFib    Cont metoprolol for rate control  Not on any anticoagulation

## 2020-01-14 NOTE — CASE MANAGEMENT
Call back from pt's daughter, [facility]  She states that pt's oldest son was pt's POA but when he passed away, the POA deferred to [facility]  She will bring a copy of the POA paperwork in when she visits tomorrow afternoon  [facility] reports that pt's behaviors started in October 2019  Prior to then, pt had never been delusional or had hallucinations  Pt's recent behaviors have included sexual inappropriateness  Pt is a devote [employer]  Although pt has dementia dx, [facility] reports that her memory is still very much intact  The family is concerned but grateful that pt is receiving IP tx  She's wondering if behaviors could be induced by pt's meds and is wondering if pt will have a med holiday while she's here

## 2020-01-14 NOTE — CMS CERTIFICATION NOTE
Certification: Based upon physical, mental and social evaluations, I certify that inpatient psychiatric services are medically necessary for this patient for a duration of 7 midnights for the treatment of major depression with psychosis  Available alternative community resources do not meet the patient's mental health care needs  I further attest that an established written individualized plan of care has been implemented and is outlined in the patient's medical records

## 2020-01-14 NOTE — NURSING NOTE
Pt upset that she could not sleep after Trazodone  Atarax given for anxiety which was effective for symptom

## 2020-01-14 NOTE — ASSESSMENT & PLAN NOTE
Continue 5 day course of Levaquin  Watch for aspiration  Patient has multilobar pneumonia which I feel is probably aspiration related    Will ask speech therapist for evaluation    Will need follow-up chest x-ray in 4 weeks

## 2020-01-14 NOTE — ASSESSMENT & PLAN NOTE
Wt Readings from Last 3 Encounters:   01/13/20 116 kg (254 lb 15 7 oz)   10/10/18 119 kg (262 lb)   09/26/17 115 kg (254 lb)         Patient has known history of chronic diastolic CHF  Currently she is euvolemic  Continue Lasix 40 mg daily    Continue medical management

## 2020-01-14 NOTE — PROGRESS NOTES
01/14/20 0900   Team Meeting   Meeting Type Daily Rounds   Team Members Present   Team Members Present Physician;Nurse;;Occupational Therapist;Other (Discipline and Name)   Physician Team Member [id number] [facility] Team Member [facility] Management Team Member Amisha Monreal   OT Team Member Mary Palomino   Other (Discipline and Name) Susan Nicole     Reviewed case with team

## 2020-01-14 NOTE — NURSING NOTE
Patient in dayroom, not socializing with others  Patient states she feels "very discouraged", and when asked what we can do for her during her stay, patient replied "I want to have the catheter, that the dr Belen Dewitt wouldn't work, I want it back "    Patient went on giving further example of being in a store with her son and having an embarrassing incontinence episode   Patient insists her catheter fell out 4 months ago

## 2020-01-14 NOTE — PROGRESS NOTES
Pt is 81 yo female admitted on 201 from [address] [place] [address] ED for  increased depression, SI without plan, paranoia, and  A/V hallucinations  She resides at [facility] assisted living  Pt  found to have UTI and pneumonia in ED, Macrobid and Levaquin prescribed  Pt paranoid, delusionsal reports seeing roommate at nursing facility sleeping with multiple men in her room  Tearful and upset following admission, poor insight regarding hospitalization  Pt is nonambulatory, needs full staff assist  Per report pt utilizes wheelchair  Small partially healed scab on left wrist and right hand  1 cm blister with dried skin on right heel  Bilateral  Ankles +3 edma  Hx atril fib, DM HTN, CHF, COPD, hypokalemia, urinary retention  Pt had urinary catheter in past    Macular degeneration, pt is legally blind  Medication compliant, reports some difficulty swallowing  Medications administered crushed in ice cream  Pt' son Yolette Coelho informed of admission, would like to speak to CM regarding tx

## 2020-01-14 NOTE — NURSING NOTE
Patient calm, confused at times, and cooperative on approach, patient denies any s/s  Patient in common areas on [facility] chair with not interaction with peers   Patient compliant with med and meals in the shift

## 2020-01-15 LAB
ATRIAL RATE: 326 BPM
QRS AXIS: 23 DEGREES
QRSD INTERVAL: 74 MS
QT INTERVAL: 410 MS
QTC INTERVAL: 479 MS
T WAVE AXIS: 44 DEGREES
VENTRICULAR RATE: 82 BPM

## 2020-01-15 PROCEDURE — 99232 SBSQ HOSP IP/OBS MODERATE 35: CPT | Performed by: PSYCHIATRY & NEUROLOGY

## 2020-01-15 PROCEDURE — 93010 ELECTROCARDIOGRAM REPORT: CPT | Performed by: INTERNAL MEDICINE

## 2020-01-15 RX ADMIN — ACETAMINOPHEN 650 MG: 325 TABLET ORAL at 05:36

## 2020-01-15 RX ADMIN — GABAPENTIN 100 MG: 100 CAPSULE ORAL at 08:47

## 2020-01-15 RX ADMIN — POTASSIUM CHLORIDE 20 MEQ: 10 TABLET, EXTENDED RELEASE ORAL at 17:18

## 2020-01-15 RX ADMIN — ARIPIPRAZOLE 2 MG: 2 TABLET ORAL at 08:47

## 2020-01-15 RX ADMIN — POTASSIUM CHLORIDE 20 MEQ: 10 TABLET, EXTENDED RELEASE ORAL at 08:47

## 2020-01-15 RX ADMIN — SPIRONOLACTONE 25 MG: 25 TABLET ORAL at 08:47

## 2020-01-15 RX ADMIN — LEVOFLOXACIN 750 MG: 750 TABLET, FILM COATED ORAL at 22:05

## 2020-01-15 RX ADMIN — METOPROLOL TARTRATE 12.5 MG: 25 TABLET ORAL at 08:49

## 2020-01-15 RX ADMIN — ACETAMINOPHEN 650 MG: 325 TABLET ORAL at 12:24

## 2020-01-15 RX ADMIN — RIVAROXABAN 15 MG: 15 TABLET, FILM COATED ORAL at 08:46

## 2020-01-15 RX ADMIN — ISOSORBIDE MONONITRATE 30 MG: 30 TABLET, EXTENDED RELEASE ORAL at 08:47

## 2020-01-15 RX ADMIN — TIOTROPIUM BROMIDE 18 MCG: 18 CAPSULE ORAL; RESPIRATORY (INHALATION) at 08:48

## 2020-01-15 RX ADMIN — PRAVASTATIN SODIUM 20 MG: 20 TABLET ORAL at 17:18

## 2020-01-15 RX ADMIN — LEVOTHYROXINE SODIUM 150 MCG: 75 TABLET ORAL at 05:36

## 2020-01-15 RX ADMIN — OYSTER SHELL CALCIUM WITH VITAMIN D 1 TABLET: 500; 200 TABLET, FILM COATED ORAL at 08:47

## 2020-01-15 RX ADMIN — SERTRALINE HYDROCHLORIDE 50 MG: 50 TABLET ORAL at 08:47

## 2020-01-15 RX ADMIN — FUROSEMIDE 40 MG: 40 TABLET ORAL at 08:46

## 2020-01-15 RX ADMIN — TRAZODONE HYDROCHLORIDE 25 MG: 50 TABLET ORAL at 22:06

## 2020-01-15 RX ADMIN — GABAPENTIN 100 MG: 100 CAPSULE ORAL at 17:18

## 2020-01-15 NOTE — PROGRESS NOTES
Pt attended decision making: problem solving group and understanding feelings  Pt did not interact when prompted and listened only  Group focused on decision making wheel and understanding the process and feelings  Continue to provide therepuetic group support         01/15/20 1100   Activity/Group Checklist   Group Other (Comment)  (decision making wheel: problem solving and feelings)   Attendance Attended   Attendance Duration (min) 31-45   Interactions Did not interact   Affect/Mood Calm   Goals Achieved Able to listen to others

## 2020-01-15 NOTE — PROGRESS NOTES
01/15/20 0832   Team Meeting   Meeting Type Daily Rounds   Team Members Present   Team Members Present Physician;Nurse;; Other (Discipline and Name)   Physician Team Member [id number] Team Member [facility] Management Team Member Matt Starks   Other (Discipline and Name) Georgena Runner     Reviewed case with team  Pt will return to [facility] at D/C  Hallucinations and delusions started since October  Medication induced?

## 2020-01-15 NOTE — CASE MANAGEMENT
Met with pt's daughter, Debbrah Holstein and Rosita Miranda, to review and sign intake paperwork including ROIs, IMM letter, and tx plan  Copies of signed documents given to Debbrah Holstein  ROIs signed for: Lashawn Mendieta (son), [facility] (residence)  The family would like pt to be placed at a new facility instead of returning to [facility]  They feel that pt is not receiving the best care there and are open to a new facility anywhere between [facility]  CM explained the challenges with placement from a U and under within a short period of time  CM explained that once pt is stable, insurance will want to see a d/c and can stop coverage if there's not enough criteria for this level of care   CM will follow up

## 2020-01-15 NOTE — NURSING NOTE
Pt reported L lower gum pain, increased during meals  Area assessed - no swelling or redness noted  Pt stated, "It's okay now," and refused intervention at this time  Will monitor

## 2020-01-15 NOTE — PROGRESS NOTES
Progress Note - [address] [facility] 80 y o  female MRN: [id number]  Unit/Bed#: Wan Dasilva 796-61 Encounter: [id number]    The patient was seen for continuing care and reviewed with treatment team   I also spoke with the patient's 2 daughters at bedside  She appeared to be more relaxed and much calmer than yesterday  She was able to sleep through the night and had a good appetite  No hallucinations and she did not verbalize delusional material    Current Mental Status Evaluation:  Appearance:  Adequate hygiene and grooming and Good eye contact   Behavior:  calm, cooperative and friendly   Mood:  improving   Affect: appropriate   Speech: Soft   Thought Process:  Goal directed and coherent   Thought Content:  Does not verbalize delusional material   Perceptual Disturbances: Denies hallucinations and does not appear to be responding to internal stimuli   Risk Potential: No suicidal or homicidal ideation   Orientation:        Progress Toward Goals:  Slowly improving  Principal Problem:    Severe episode of recurrent major depressive disorder, with psychotic features ([facility] )  Active Problems:    Permanent atrial fibrillation    Chronic diastolic congestive heart failure ([facility] )    Morbid obesity ([facility] )    Essential hypertension    COPD (chronic obstructive pulmonary disease) ([facility] )    Late onset Alzheimer's disease with behavioral disturbance ([facility] )    Pneumonia of both lungs due to infectious organism    Acute cystitis with hematuria      Recommended Treatment: Continue with pharmacotherapy, group therapy, milieu therapy and occupational therapy    The patient will be maintained on the following medications:    Current Facility-Administered Medications:  acetaminophen 650 mg Oral Q6H Rocio Sim MD   acetaminophen 650 mg Oral Q4H PRN Scott Grider MD   aluminum-magnesium hydroxide-simethicone 30 mL Oral Q4H PRN Scott Grider MD   ARIPiprazole 2 mg Oral Daily Dima Connell MD   artificial tear 2 application Both Eyes TID PRN Patricia Padilla MD   benztropine 1 mg Oral Q6H PRN Kamala Sparrow MD   calcium carbonate-vitamin D 1 tablet Oral Daily Patricia Padilla MD   docusate sodium 100 mg Oral BID PRN Patricia Padilla MD   furosemide 40 mg Oral Daily Patricia Padilla MD   gabapentin 100 mg Oral BID Patricia Padilla MD   haloperidol 1 mg Oral Q8H PRN Kamala Sparrow MD   haloperidol lactate 1 mg Intramuscular Q8H PRN Kamala Sparrow MD   hydrOXYzine HCL 25 mg Oral Q6H PRN Kamala Sparrow MD   ipratropium 0 5 mg Nebulization Q8H PRN NESHA Isbell   isosorbide mononitrate 30 mg Oral Daily Patricia Padilla MD   levalbuterol 1 25 mg Nebulization Q8H PRN NESHA Isbell   levofloxacin 750 mg Oral Q24H Patricia Padilla MD   levothyroxine 150 mcg Oral Early Morning Patricia Padilla MD   magnesium hydroxide 30 mL Oral Daily PRN Kamala Sparrow MD   metoprolol tartrate 12 5 mg Oral Daily Patricia Padilla MD   nitroglycerin 0 4 mg Sublingual Q5 Min PRN Patricia Padilla MD   potassium chloride 20 mEq Oral BID With Meals Patricia Padilla MD   pravastatin 20 mg Oral Daily With Rosie Craig MD   risperiDONE 0 5 mg Oral Q8H PRN Kamala Sparrow MD   rivaroxaban 15 mg Oral Daily With Breakfast Patricia Padilla MD   sertraline 50 mg Oral Daily Светлана Hines MD   spironolactone 25 mg Oral Daily Patricia Padilla MD   tiotropium 18 mcg Inhalation Daily Patricia Padilla MD   traZODone 25 mg Oral HS Светлана Hines MD

## 2020-01-15 NOTE — CASE MANAGEMENT
Spoke to Felton at [facility] [phone] to confirm that pt is allowed to return after d/c  Felton reports that pt has had psych follow-up with [facility] at the facility and is seen at least 1x month if not more  When d/c is anticipated, CM should [phone]

## 2020-01-15 NOTE — NURSING NOTE
Pt calm, cooperative with care and pleasant  Pt med compliant and reported no symptoms / SI HI AH VH  Pt stated she feels much better than days prior  Visible in milieu and out in dayroom for much of shift  O2 removed with respiratory's recommendation and sPO2 stable on RA  No distress noted  Will continue to monitor

## 2020-01-15 NOTE — NURSING NOTE
Patient was visible in the day room  She is pleasant and cooperative  Alert and oriented 2/3  Confused at times  Knows she is in the hospital and what year it is  She is Complaint with care and medications  Takes her pills crushed in pudding  Assist of two with sit to stand  Bed/chair alarm  o2 stable with 2 liters nasal canula  Patient is currently in bed  No distress noted

## 2020-01-15 NOTE — PLAN OF CARE
Problem: Potential for Falls  Goal: Patient will remain free of falls  Description  INTERVENTIONS:  - Assess patient frequently for physical needs  -  Identify cognitive and physical deficits and behaviors that affect risk of falls    -  [place] fall precautions as indicated by assessment   - Educate patient/family on patient safety including physical limitations  - Instruct patient to call for assistance with activity based on assessment  - Modify environment to reduce risk of injury  - Consider OT/PT consult to assist with strengthening/mobility  Outcome: Progressing     Problem: Prexisting or High Potential for Compromised Skin Integrity  Goal: Skin integrity is maintained or improved  Description  INTERVENTIONS:  - Identify patients at risk for skin breakdown  - Assess and monitor skin integrity  - Assess and monitor nutrition and hydration status  - Monitor labs   - Assess for incontinence   - Turn and reposition patient  - Assist with mobility/ambulation  - Relieve pressure over bony prominences  - Avoid friction and shearing  - Provide appropriate hygiene as needed including keeping skin clean and dry  - Evaluate need for skin moisturizer/barrier cream  - Collaborate with interdisciplinary team   - Patient/family teaching  - Consider wound care consult   Outcome: Progressing     Problem: DEPRESSION  Goal: Will be euthymic at discharge  Description  INTERVENTIONS:  - Administer medication as ordered  - Provide emotional support via 1:1 interaction with staff  - Encourage involvement in milieu/groups/activities  - Monitor for social isolation  Outcome: Progressing     Problem: Alteration in Orientation  Goal: Treatment Goal: Demonstrate a reduction of confusion and improved orientation to person, place, time and/or situation upon discharge, according to optimum baseline/ability  Outcome: Progressing     Problem: DISCHARGE PLANNING  Goal: Discharge to home or other facility with appropriate resources  Description  INTERVENTIONS:  - Identify barriers to discharge w/patient and caregiver  - Arrange for needed discharge resources and transportation as appropriate  - Identify discharge learning needs (meds, wound care, etc )  - Arrange for interpretive services to assist at discharge as needed  - Refer to Case Management Department for coordinating discharge planning if the patient needs post-hospital services based on physician/advanced practitioner order or complex needs related to functional status, cognitive ability, or social support system  Outcome: Progressing

## 2020-01-16 PROCEDURE — 99232 SBSQ HOSP IP/OBS MODERATE 35: CPT | Performed by: PSYCHIATRY & NEUROLOGY

## 2020-01-16 PROCEDURE — 97167 OT EVAL HIGH COMPLEX 60 MIN: CPT

## 2020-01-16 RX ORDER — NYSTATIN 100000 [USP'U]/G
1 POWDER TOPICAL 2 TIMES DAILY
Status: DISCONTINUED | OUTPATIENT
Start: 2020-01-16 | End: 2020-01-22 | Stop reason: HOSPADM

## 2020-01-16 RX ORDER — POTASSIUM CHLORIDE 20MEQ/15ML
20 LIQUID (ML) ORAL 2 TIMES DAILY WITH MEALS
Status: DISCONTINUED | OUTPATIENT
Start: 2020-01-16 | End: 2020-01-22 | Stop reason: HOSPADM

## 2020-01-16 RX ADMIN — SPIRONOLACTONE 25 MG: 25 TABLET ORAL at 08:42

## 2020-01-16 RX ADMIN — RIVAROXABAN 15 MG: 15 TABLET, FILM COATED ORAL at 08:42

## 2020-01-16 RX ADMIN — FUROSEMIDE 40 MG: 40 TABLET ORAL at 08:42

## 2020-01-16 RX ADMIN — LEVOFLOXACIN 750 MG: 750 TABLET, FILM COATED ORAL at 22:04

## 2020-01-16 RX ADMIN — ACETAMINOPHEN 650 MG: 325 TABLET ORAL at 12:32

## 2020-01-16 RX ADMIN — METOPROLOL TARTRATE 12.5 MG: 25 TABLET ORAL at 08:41

## 2020-01-16 RX ADMIN — TRAZODONE HYDROCHLORIDE 25 MG: 50 TABLET ORAL at 21:38

## 2020-01-16 RX ADMIN — ISOSORBIDE MONONITRATE 30 MG: 30 TABLET, EXTENDED RELEASE ORAL at 08:42

## 2020-01-16 RX ADMIN — ARIPIPRAZOLE 2 MG: 2 TABLET ORAL at 08:40

## 2020-01-16 RX ADMIN — TIOTROPIUM BROMIDE 18 MCG: 18 CAPSULE ORAL; RESPIRATORY (INHALATION) at 08:43

## 2020-01-16 RX ADMIN — OYSTER SHELL CALCIUM WITH VITAMIN D 1 TABLET: 500; 200 TABLET, FILM COATED ORAL at 08:40

## 2020-01-16 RX ADMIN — ACETAMINOPHEN 650 MG: 325 TABLET ORAL at 17:46

## 2020-01-16 RX ADMIN — PRAVASTATIN SODIUM 20 MG: 20 TABLET ORAL at 17:46

## 2020-01-16 RX ADMIN — ACETAMINOPHEN 650 MG: 325 TABLET ORAL at 06:15

## 2020-01-16 RX ADMIN — GABAPENTIN 100 MG: 100 CAPSULE ORAL at 08:42

## 2020-01-16 RX ADMIN — LEVOTHYROXINE SODIUM 150 MCG: 75 TABLET ORAL at 05:02

## 2020-01-16 RX ADMIN — POTASSIUM CHLORIDE 20 MEQ: 10 TABLET, EXTENDED RELEASE ORAL at 08:40

## 2020-01-16 RX ADMIN — POTASSIUM CHLORIDE 20 MEQ: 20 SOLUTION ORAL at 17:47

## 2020-01-16 RX ADMIN — SERTRALINE HYDROCHLORIDE 50 MG: 50 TABLET ORAL at 08:40

## 2020-01-16 RX ADMIN — GABAPENTIN 100 MG: 100 CAPSULE ORAL at 17:46

## 2020-01-16 RX ADMIN — ACETAMINOPHEN 650 MG: 325 TABLET ORAL at 00:28

## 2020-01-16 NOTE — PLAN OF CARE
Problem: Potential for Falls  Goal: Patient will remain free of falls  Description  INTERVENTIONS:  - Assess patient frequently for physical needs  -  Identify cognitive and physical deficits and behaviors that affect risk of falls    -  [place] fall precautions as indicated by assessment   - Educate patient/family on patient safety including physical limitations  - Instruct patient to call for assistance with activity based on assessment  - Modify environment to reduce risk of injury  - Consider OT/PT consult to assist with strengthening/mobility  1/16/2020 1014 by Walter Bautista RN  Outcome: Progressing  1/16/2020 1013 by Walter Bautista RN  Outcome: Progressing     Problem: Prexisting or High Potential for Compromised Skin Integrity  Goal: Skin integrity is maintained or improved  Description  INTERVENTIONS:  - Identify patients at risk for skin breakdown  - Assess and monitor skin integrity  - Assess and monitor nutrition and hydration status  - Monitor labs   - Assess for incontinence   - Turn and reposition patient  - Assist with mobility/ambulation  - Relieve pressure over bony prominences  - Avoid friction and shearing  - Provide appropriate hygiene as needed including keeping skin clean and dry  - Evaluate need for skin moisturizer/barrier cream  - Collaborate with interdisciplinary team   - Patient/family teaching  - Consider wound care consult   1/16/2020 1014 by Walter Bautista RN  Outcome: Progressing  1/16/2020 1013 by Walter Bautista RN  Outcome: Progressing     Problem: DEPRESSION  Goal: Will be euthymic at discharge  Description  INTERVENTIONS:  - Administer medication as ordered  - Provide emotional support via 1:1 interaction with staff  - Encourage involvement in milieu/groups/activities  - Monitor for social isolation  1/16/2020 1014 by Walter Bautista RN  Outcome: Progressing  1/16/2020 1013 by Walter Bautista RN  Outcome: Progressing     Problem: Alteration in Orientation  Goal: Treatment Goal: Demonstrate a reduction of confusion and improved orientation to person, place, time and/or situation upon discharge, according to optimum baseline/ability  1/16/2020 1014 by Dimitris Chakraborty RN  Outcome: Progressing  1/16/2020 1013 by Dimitris Chakraborty, RN  Outcome: Progressing

## 2020-01-16 NOTE — PLAN OF CARE
Problem: Potential for Falls  Goal: Patient will remain free of falls  Description  INTERVENTIONS:  - Assess patient frequently for physical needs  -  Identify cognitive and physical deficits and behaviors that affect risk of falls    -  [place] fall precautions as indicated by assessment   - Educate patient/family on patient safety including physical limitations  - Instruct patient to call for assistance with activity based on assessment  - Modify environment to reduce risk of injury  - Consider OT/PT consult to assist with strengthening/mobility  Outcome: Progressing     Problem: Prexisting or High Potential for Compromised Skin Integrity  Goal: Skin integrity is maintained or improved  Description  INTERVENTIONS:  - Identify patients at risk for skin breakdown  - Assess and monitor skin integrity  - Assess and monitor nutrition and hydration status  - Monitor labs   - Assess for incontinence   - Turn and reposition patient  - Assist with mobility/ambulation  - Relieve pressure over bony prominences  - Avoid friction and shearing  - Provide appropriate hygiene as needed including keeping skin clean and dry  - Evaluate need for skin moisturizer/barrier cream  - Collaborate with interdisciplinary team   - Patient/family teaching  - Consider wound care consult   Outcome: Progressing     Problem: DEPRESSION  Goal: Will be euthymic at discharge  Description  INTERVENTIONS:  - Administer medication as ordered  - Provide emotional support via 1:1 interaction with staff  - Encourage involvement in milieu/groups/activities  - Monitor for social isolation  Outcome: Progressing     Problem: Alteration in Orientation  Goal: Treatment Goal: Demonstrate a reduction of confusion and improved orientation to person, place, time and/or situation upon discharge, according to optimum baseline/ability  Outcome: Progressing     Problem: Ineffective Coping  Goal: Participates in unit activities  Description  Interventions:  - Provide therapeutic environment   - Provide required programming   - Redirect inappropriate behaviors   Outcome: Progressing     Problem: DISCHARGE PLANNING  Goal: Discharge to home or other facility with appropriate resources  Description  INTERVENTIONS:  - Identify barriers to discharge w/patient and caregiver  - Arrange for needed discharge resources and transportation as appropriate  - Identify discharge learning needs (meds, wound care, etc )  - Arrange for interpretive services to assist at discharge as needed  - Refer to Case Management Department for coordinating discharge planning if the patient needs post-hospital services based on physician/advanced practitioner order or complex needs related to functional status, cognitive ability, or social support system  Outcome: Progressing

## 2020-01-16 NOTE — NURSING NOTE
Pt cooperative with care  Pt presented with anxiety while in bed this morning, stating she could not breathe  SpO2 stable at that time with no cyanosis noted  Pt transferred OOB and appeared calm at that time  Pt tearful during discussion with RN in relations to not liking the facility that she resides at  Pt med compliant and denied all symptoms  Pt reported oral pain during breakfast but refused all interventions offered  No oral pain reported during lunch when assessed  Will monitor

## 2020-01-16 NOTE — OCCUPATIONAL THERAPY NOTE
Occupational Therapy Evaluation      Trudi Pouch    1/16/2020    Patient Active Problem List   Diagnosis    Chronic indwelling Ha catheter    Permanent atrial fibrillation    Chronic diastolic congestive heart failure ([address]    Morbid obesity ([address] )    Essential hypertension    Hypoxia    COPD [facility]chronic obstructive pulmonary disease) [facility]    Chronic indwelling Ha catheter    Amiodarone pulmonary toxicity    Severe episode of recurrent major depressive disorder, with psychotic features ([address] )    Late onset Alzheimer's disease with behavioral disturbance ([address] )    Pneumonia of both lungs due to infectious organism    Acute cystitis with hematuria       Past Medical History:   Diagnosis Date    Abdominal aortic aneurysm ([facility]     Angina pectoris ([facility]     Anginal pain ([address] )     Anxiety     Anxiety     Atrial fibrillation ([address]     Blind     CHF (congestive heart failure) ([facility]     Chronic diastolic congestive heart failure ([address] ) 7/25/2016    Chronic indwelling Ha catheter     COPD (chronic obstructive pulmonary disease) ([address]     Depressive disorder     Diabetes mellitus ([address] )     Essential hypertension 7/25/2016    Fever 9/12/2016    Hypertension     Hypokalemia     Hypokalemia     Hypoxia 9/12/2016    Macular degeneration     Major depression     Muscle wasting and atrophy, not elsewhere classified, unspecified site     Myocardial infarction ([address] )     Paroxysmal atrial fibrillation ([address] ) 7/24/2016    Psychiatric disorder     Psychosis ([address] )     Psychosis ([address] )     Urinary retention     Urine retention     UTI (urinary tract infection) 9/12/2016       Past Surgical History:   Procedure Laterality Date    ABDOMINAL AORTIC ANEURYSM REPAIR      APPENDECTOMY      MO BRONCHOSCOPY,DIAGNOSTIC N/A 3/20/2017    Procedure: BRONCHOSCOPY FLEXIBLE;  Surgeon: Yuri Marshall DO;  Location:  MAIN OR;  Service: Pulmonary       Subjective:  RE: reason for hospitalization: "My daughter picked me up  There were others involved "    Per her record, she was noted to have come to the hospital on a 201 admission due to increased depression, SI  Without plan, paranoia, auditory and visual hallucinations  She has been noted to have progressive cognitive decline and had history of recurrent episodes of major depression with previous hospitalizations  Family had noted increased depressive symptoms with crying spells and death wishes and paranoid delusions and auditory and visual hallucinations  Prior Level of Function:  Nidia Stern was pleasant on approach  Her self report was at times somewhat conflicting, at times she did appear spot on during the interview  She stated that she has been living in an [facility] but does not want to return to that particular one [facility])  She stated that she had been living in this facility for the past 2 years  She stated that she uses a wheelchair for mobility needs  She stated that she was doing transfers with little or no help in the not so distant past, she stated currently staff at the facility were using a sit to stand  She stated that she does receive assistance for her personal care, I e , dressing, but she sometimes is able to do her UE dressing  She does feed herself with occasional assistance for set up  The facility does provide for her meals, many IADL's  She does not drive  Her daughter reports that she is mom's POA  Her medications are administered via the facility  Falls:  She stated that she had a fall more that 6 months ago  Vision and Hearing:  She was not wearing glasses at time of assessment  She stated that she has macular degeneration, she is legally blind  She does report some hearing challenge and did appear to have this during the assessment  Alert & Oriented   She was alert, oriented to person, place, generally to time (one day off from actual date), somewhat to reason for hospitalization      Hobbies/Interests:  She stated that she did not have hobbies anymore  She stated that in the past she liked to drive, was involved with [employer], liked to socialize  Support:   She reported that her daughters are a support  Pain:  She stated that she does have pain in her back, head  She rated this pain a 7 out of 10, she stated that she did take Tylenol for this (nurse confirmed that she did have Tylenol)  She also reported that she has dry mouth  R UE AROM limited AROM not WFL's (approximately 50 degrees, range was increased with AAROM) some pain on end range    RUE  Strength good minus grasp strength    LUE AROM limited AROM not WFL's (approximately 40 degrees, range was increased with AAROM) some pain on end range    LUE Strength F+/G- strength    She was encouraged to carry out simple AROM UE exercise to promote strength, mobility, decrease pain  Current Level of Function:  She utilizes a wheelchair for mobility, she has been dependent in moving about the wheelchair  She has been generally independent in feeding herself in this environment  She is noted to receive maximum assistance for personal care and admits that she generally requires much assistance for this  Work Task Skills:  Task Investment she was invested in tasks presented to her but was limited due to visual and physical issues  She did invest well as able in various tasks and generally did try to carry out requests  Problem Solving Unable to recognize errors on novel tasks without assistance in part due to visual challenges  Concentration she was attentive to interview process; she appeared more focussed and her responses were relevant during the earlier part of the interview, she did appear somewhat delusional during the last 10 minutes of the assessment    Follows Direction she did carry out 1-2 step verbal directions  Frustration Tolerance Handles tasks with no frustration she was pleasant and in good spirits during the assessment    Social Skills:  Dyadic Interaction (eye contact, makes needs known, goal directed conversation)  Eye contact: Fair  Quality of Response: her responses were relevant in gist to questions posed, sometimes information presented was inconsistent with prior information presented  Goal Directed: generally her conversation was goal directed, she did make needs known in a clear manner  False Beliefs: Yes She did make odd comments towards the end of the interview, I e , she stated that she wanted to have a catheter again, that she did not like being wet  She then went on to describe how she had recently had a catheter that looked like a penis  She did verbalize suspiciousness regarding staff at the facility she was living at, stating that there were only 3 staff she could trust there  Assessment performed:    Pt is a 80 y o  female seen for OT evaluation s/p admit to [address] [place] [address] on 1/13/2020 w/ Severe episode of recurrent major depressive disorder, with psychotic features ([facility]  Comorbidities affecting pt's functional performance at time of assessment include: permanent atrial fibrillation, chronic diastolic congestive heart failure, morbid obesity, HTN, COPD, late onset Alzheimer's disease, pneumonia of both lungs, acute cystitis, hypoxia hx  Personal factors affecting pt at time of IE include:behavioral pattern, difficulty performing ADLS, difficulty performing IADLS , health management , environment and chronic health issues, decreased coping skills, declining physical function, noted progressive cognitive decline, unstable mood, delusions and hx of hallucinations  Prior to admission, pt was residing at [facility]   Upon evaluation: Pt requires treatment with consideration of the following deficits impacting occupational performance: weakness, decreased ROM, decreased tolerance, impaired initiation, impaired problem solving, decreased coping skills and unstable mood, decreased reality focus, decreased life management skills  Pt to benefit from continued skilled OT tx while in the hospital to address deficits as defined above and maximize level of functional independence w ADL's and functional mobility  Occupational Performance areas to address include: socialization, health maintenance, functional mobility, social participation and activity tolerance, coping skills  From OT standpoint, recommendation at time of d/c would be to return to a supervised, supportive living environment with 24/7 supervision and available assistance (I e , place she had been residing at) when psychiatrically stable and with community supports  Patient Goal:  "I want to be wearing a catheter " She stated that she had a catheter in the past and again wanted a catheter  Frequency:  1-2 X/week    Goals:  1  Increase UE strength and AROM  2  Identify and explore strategies to promote improved functioning and wellness  3 Identify 3 positive qualities of self  4  Demonstrate 45 minutes of stabilized mood  5  Interact with others in a reality based manner without interference from false beliefs 100% of the time        Goal Expiration:   30 days 02/15/ 2020    Group Recommendations:   Exercise  Coping skills  Pet therapy  Socialization  Spirituality  Positive focus  Self esteem  Dealing with change  Life management  Stress management  Travel discussion    Sandee Benson, OT

## 2020-01-16 NOTE — NURSING NOTE
Patient became agitated, stating repeatedly "I have to go, I don't want to be here!" Patient appeared tearful and pushed dining table away from her  This RN spoke to patient for several minutes, assuring her she is safe in this environment  Patient placed in reclining position with pillow

## 2020-01-16 NOTE — PROGRESS NOTES
Progress Note - Hayden Aissatou [age] y o  female MRN: [id number]   Unit/Bed#: Jan Gains 604-01 Encounter: [id number]      Subjective: The patient chart reviewed and case discussed with the treatment team   The patient was seen in the dining room in the unit today  Patient seemed to be mildly confused  She reports she had a bad night  She reported she was not able to sleep well as her bed was too small for her height of 5 ft 9"  The patient does not seem to be of that height from the observation  She reports her depression though has been better  Denies any suicidal thoughts  Denies any auditory or visual hallucination  The patient was oriented to place, person and the month  The patient refused her evening medication but later took nighttime meds  She has been visible in the milieu and eating well  Denied any other concern  ROS: no complaints, all other systems are negative    Mental Status Evaluation:    Appearance:  age appropriate   Behavior:  calm   Speech:  coherent   Mood:  improved, depressed   Affect:  constricted   Thought Process:  illogical few times   Associations: circumstantial associations   Thought Content:  Did not endorse paranoia during the meeting  Perceptual Disturbances: denies auditory or visual hallucinations when asked   Risk Potential: Suicidal ideation - None  Homicidal ideation - None  Potential for aggression - No   Sensorium:  oriented to person, place and month of year   Memory:  recent memory mildly impaired   Consciousness:  alert and awake   Attention: decreased concentration   Insight:  limited   Judgment: limited   Gait/Station: Sarah chair   Motor Activity: no abnormal movements     Vital signs in last 24 hours:    Temp:  [98 3 °F (36 8 °C)-98 5 °F (36 9 °C)] 98 3 °F (36 8 °C)  HR:  [84-93] 84  Resp:  [17-18] 17  BP: (111-132)/(69-82) 111/71    Laboratory results:   I have personally reviewed all pertinent laboratory/tests results    Most Recent Labs:   Lab Results   Component Value Date    WBC 5 60 01/14/2020    RBC 4 84 01/14/2020    HGB 14 8 01/14/2020    HCT 44 2 01/14/2020     01/14/2020    RDW 13 8 01/14/2020    NEUTROABS 3 60 01/14/2020    SODIUM 139 01/14/2020    K 3 7 01/14/2020     01/14/2020    CO2 28 01/14/2020    BUN 16 01/14/2020    CREATININE 0 96 01/14/2020    GLUC 99 01/14/2020    GLUF 99 01/14/2020    CALCIUM 9 1 01/14/2020    AST 16 01/14/2020    ALT 11 01/14/2020    ALKPHOS 56 01/14/2020    TP 6 9 01/14/2020    ALB 3 4 01/14/2020    TBILI 0 60 01/14/2020    CHOLESTEROL 139 01/14/2020    HDL 20 (L) 01/14/2020    TRIG 165 (H) 01/14/2020    LDLCALC 86 01/14/2020    NONHDLC 119 01/14/2020    LUV3HNYTTNEX 0 738 01/14/2020    RPR Non-Reactive 01/14/2020    HGBA1C 5 4 03/21/2017     03/21/2017       Progress Toward Goals: improving slowly    Assessment/Plan  patient depression is somewhat better but still mildly confused  No gross psychosis noted  The plan is to continue with her current medication with no changes  Will continue monitor her for her mood, behavior, safety, sleep and response to meds  Principal Problem:    Severe episode of recurrent major depressive disorder, with psychotic features ([facility] )  Active Problems:    Permanent atrial fibrillation    Chronic diastolic congestive heart failure ([facility] )    Morbid obesity (HCC)    Essential hypertension    COPD (chronic obstructive pulmonary disease) ([facility] )    Late onset Alzheimer's disease with behavioral disturbance ([facility] )    Pneumonia of both lungs due to infectious organism    Acute cystitis with hematuria    Recommended Treatment:     Planned medication and treatment changes:     All current active medications have been reviewed  Encourage group therapy, milieu therapy and occupational therapy  Behavioral Health checks every 7 minutes      Current Facility-Administered Medications:  acetaminophen 650 mg Oral Q6H Arden Martinez MD   acetaminophen 650 mg Oral Q4H PRN Danika Whitmore MD   aluminum-magnesium hydroxide-simethicone 30 mL Oral Q4H PRN Danika Whitmore MD   ARIPiprazole 2 mg Oral Daily Redd Worley MD   artificial tear 2 application Both Eyes TID PRN Prakash Quezada MD   benztropine 1 mg Oral Q6H PRN Danika Whitmore MD   calcium carbonate-vitamin D 1 tablet Oral Daily Prakash Quezada MD   docusate sodium 100 mg Oral BID PRN Prakash Quezada MD   furosemide 40 mg Oral Daily Prakash Quezada MD   gabapentin 100 mg Oral BID Prakash Quezada MD   haloperidol 1 mg Oral Q8H PRN Danika Whitmore MD   haloperidol lactate 1 mg Intramuscular Q8H PRN Danika Whitmore MD   hydrOXYzine HCL 25 mg Oral Q6H PRN Danika Whitmore MD   ipratropium 0 5 mg Nebulization Q8H PRN NESHA Velez   isosorbide mononitrate 30 mg Oral Daily Prakash Quezada MD   levalbuterol 1 25 mg Nebulization Q8H PRN NESHA Velez   levofloxacin 750 mg Oral Q24H Prakash Quezada MD   levothyroxine 150 mcg Oral Early Morning Prakash Quezada MD   magnesium hydroxide 30 mL Oral Daily PRN Danika Whitmore MD   metoprolol tartrate 12 5 mg Oral Daily Prakash Quezada MD   nitroglycerin 0 4 mg Sublingual Q5 Min PRN Prakash Quezada MD   potassium chloride 20 mEq Oral BID With Meals Prakash Quezada MD   pravastatin 20 mg Oral Daily With [place] Jeannie Banks MD   risperiDONE 0 5 mg Oral Q8H PRN Danika Whitmore MD   rivaroxaban 15 mg Oral Daily With Breakfast Prakash Quezada MD   sertraline 50 mg Oral Daily Redd Worley MD   spironolactone 25 mg Oral Daily Prakash Quezada MD   tiotropium 18 mcg Inhalation Daily Prakash Quezada MD   traZODone 25 mg Oral HS Redd Worley MD       Risks / Benefits of Treatment:    Risks, benefits, and possible side effects of medications explained to patient and patient verbalizes understanding and agreement for treatment  Counseling / Coordination of Care:    Patient's progress discussed with staff in treatment team meeting  Medications, treatment progress and treatment plan reviewed with patient      Gladys Gibson MD 01/16/20

## 2020-01-16 NOTE — PROGRESS NOTES
01/16/20 1000   Team Meeting   Meeting Type Daily Rounds   Team Members Present   Team Members Present Physician;Nurse; Other (Discipline and Name);    Physician Team Member Bran Mccauley   Nursing Team Member Vel Schwartz, [address] [place] [address] Management Team Member Tracy Clark   Other (Discipline and Name) PharmD Barbara Sousa   Pt to return to [facility] at time of DC

## 2020-01-16 NOTE — NURSING NOTE
Pt awake and alert all shift  Labile at times refusing 1800 meds but accepted HS meds  Bladder scan 285 at 1700 and 125 at HS  No report of mouth pain

## 2020-01-16 NOTE — CASE MANAGEMENT
Spoke with pt's daughter, Glendon Ormond, to give update on pt's progress and tx  She requested a list of alternative SNFs that she and her siblings will be contacting to see if pt can transition to a new SNF right from the hospital  List will be emailed to Kang Grovemond  She will be coming to visit pt tonight   CM transferred PC to nurse's desk for further info on tx

## 2020-01-17 PROCEDURE — 99232 SBSQ HOSP IP/OBS MODERATE 35: CPT | Performed by: NURSE PRACTITIONER

## 2020-01-17 RX ORDER — ARIPIPRAZOLE 5 MG/1
5 TABLET ORAL DAILY
Status: DISCONTINUED | OUTPATIENT
Start: 2020-01-18 | End: 2020-01-21

## 2020-01-17 RX ORDER — LANOLIN ALCOHOL/MO/W.PET/CERES
3 CREAM (GRAM) TOPICAL
Status: DISCONTINUED | OUTPATIENT
Start: 2020-01-17 | End: 2020-01-22 | Stop reason: HOSPADM

## 2020-01-17 RX ADMIN — MELATONIN TAB 3 MG 3 MG: 3 TAB at 21:04

## 2020-01-17 RX ADMIN — ACETAMINOPHEN 650 MG: 325 TABLET ORAL at 17:31

## 2020-01-17 RX ADMIN — TIOTROPIUM BROMIDE 18 MCG: 18 CAPSULE ORAL; RESPIRATORY (INHALATION) at 09:40

## 2020-01-17 RX ADMIN — LEVOTHYROXINE SODIUM 150 MCG: 75 TABLET ORAL at 06:03

## 2020-01-17 RX ADMIN — ACETAMINOPHEN 650 MG: 325 TABLET ORAL at 06:03

## 2020-01-17 RX ADMIN — GABAPENTIN 100 MG: 100 CAPSULE ORAL at 08:32

## 2020-01-17 RX ADMIN — LEVOFLOXACIN 750 MG: 750 TABLET, FILM COATED ORAL at 21:04

## 2020-01-17 RX ADMIN — POTASSIUM CHLORIDE 20 MEQ: 20 SOLUTION ORAL at 08:32

## 2020-01-17 RX ADMIN — ACETAMINOPHEN 650 MG: 325 TABLET ORAL at 12:17

## 2020-01-17 RX ADMIN — ISOSORBIDE MONONITRATE 30 MG: 30 TABLET, EXTENDED RELEASE ORAL at 08:32

## 2020-01-17 RX ADMIN — OYSTER SHELL CALCIUM WITH VITAMIN D 1 TABLET: 500; 200 TABLET, FILM COATED ORAL at 08:32

## 2020-01-17 RX ADMIN — GABAPENTIN 100 MG: 100 CAPSULE ORAL at 17:23

## 2020-01-17 RX ADMIN — RIVAROXABAN 15 MG: 15 TABLET, FILM COATED ORAL at 09:38

## 2020-01-17 RX ADMIN — METOPROLOL TARTRATE 12.5 MG: 25 TABLET ORAL at 08:32

## 2020-01-17 RX ADMIN — ARIPIPRAZOLE 2 MG: 2 TABLET ORAL at 08:32

## 2020-01-17 RX ADMIN — POTASSIUM CHLORIDE 20 MEQ: 20 SOLUTION ORAL at 17:23

## 2020-01-17 RX ADMIN — PRAVASTATIN SODIUM 20 MG: 20 TABLET ORAL at 17:23

## 2020-01-17 RX ADMIN — FUROSEMIDE 40 MG: 40 TABLET ORAL at 08:32

## 2020-01-17 RX ADMIN — SPIRONOLACTONE 25 MG: 25 TABLET ORAL at 08:32

## 2020-01-17 RX ADMIN — SERTRALINE HYDROCHLORIDE 50 MG: 50 TABLET ORAL at 08:32

## 2020-01-17 RX ADMIN — NYSTATIN 1 APPLICATION: 100000 POWDER TOPICAL at 17:23

## 2020-01-17 RX ADMIN — NYSTATIN 1 APPLICATION: 100000 POWDER TOPICAL at 09:38

## 2020-01-17 RX ADMIN — TRAZODONE HYDROCHLORIDE 25 MG: 50 TABLET ORAL at 21:04

## 2020-01-17 RX ADMIN — HYDROXYZINE HYDROCHLORIDE 25 MG: 25 TABLET ORAL at 12:25

## 2020-01-17 RX ADMIN — ACETAMINOPHEN 650 MG: 325 TABLET ORAL at 00:52

## 2020-01-17 NOTE — PROGRESS NOTES
01/17/20 1000 01/17/20 1100 01/17/20 1400   Activity/Group Checklist   Group Admission/Discharge  (relapse prevention planning ) Other (Comment)  (relapse prevention strategies) Life Skills  (letting go topic)   Attendance Attended Attended Attended   Attendance Duration (min) 0-15 31-45 0-15   Interactions Other (Comment)  (conversed when prompted then was excused by nursing ) Did not interact Did not interact   Affect/Mood Calm Other (Comment)  (restless) Calm   Goals Achieved Able to listen to others Able to listen to others Able to listen to others

## 2020-01-17 NOTE — NURSING NOTE
Patient incontinent of small amount of urine, bladder scan for 300 ml  Patient repositioned and again incontinent of a large amount of urine  Ethel care given  Heels elevated of of bed with pillows  Patient refused to stay positioned on side

## 2020-01-17 NOTE — PROGRESS NOTES
01/17/20 0834   Team Meeting   Meeting Type Daily Rounds   Team Members Present   Team Members Present Physician;Nurse;;Occupational Therapist;Other (Discipline and Name)   Physician Team Member [id number] Team Member [facility] Management Team Member Mariya Barnett   OT Team Member Norrine Lombard   Other (Discipline and Name) Rachelle Anderson     Reviewed case with team  Wound consult, D/C urinary retention protocol  Last covered day 1/21  Placement

## 2020-01-17 NOTE — NURSING NOTE
Pt is calm and cooperative  Pt reports anxiety, depression, appears to be having visual hallucinations  Pt got upset when seeing a cup that wasn't in her hand  Pt believed she was spilling water  Pt is seen in the dayroom, social at times  Pt offers no complaints, is medication compliant  PRN Atarax given for anxiety, which was effective

## 2020-01-17 NOTE — PROGRESS NOTES
Pt able to verbalize needs, requests asistance to use bathroom  Napping in bed  Pt urinated several times throughout shift, post void 217 ml  No bladder discomfort reported  Pt receptive to staff assisting  Ate 25% dinner and  fluids  Skin breakdown with linear reddened and open area noted in left side of fold of pannus, hydroguard applied  Nystatin ordered  Pleasant and cooperative in evening, resting comfortably  Compliant with crushed medications

## 2020-01-17 NOTE — PROGRESS NOTES
Progress Note - [address] [facility] 80 y o  female MRN: [id number]  Unit/Bed#: Pia Nash 971-11 Encounter: [id number]    The patient was seen for continuing care and reviewed with treatment team   Staff reports the patient has been anxious and depressed  She appears to have visual hallucinations at times  She has been complaining of mouth pain but staff could not find anything out of ordinary on visual inspection  The patient reports she is feeling very nervous and that she has always had a bad case of nerves"  Reports she did not sleep well and she was shaking all night  Her mouth is sore because she said there was too much adhesive on her dentures and it made it difficult to remove them  She felt upset yesterday because she says no one brought her to group  She reported a good visit with her daughters however she said they discussed housing options after discharge and she felt that everything was still up in the air" which she does not like  She is not currently having any hallucinations  She did not verbalize any delusional material at this time  The family is currently visiting and very concerned because they said the patient is extremely delusional and hallucinating and crying because of such during their visit  Explained diagnosis and treatment plan and provided education regarding medications  Current Mental Status Evaluation:  Appearance:  Good eye contact and disheveled   Behavior:  cooperative   Mood:  anxious   Affect: constricted   Speech: Soft   Thought Process:   Tangential   Thought Content:  Does not verbalize delusional material   Perceptual Disturbances: Denies hallucinations and does not appear to be responding to internal stimuli   Risk Potential: No suicidal or homicidal ideation   Orientation:   Oriented to place and person     Progress Toward Goals:  Seems to be improving  Principal Problem:    Severe episode of recurrent major depressive disorder, with psychotic features ([facility] )  Active Problems:    Permanent atrial fibrillation    Chronic diastolic congestive heart failure ([facility] )    Morbid obesity (HCC)    Essential hypertension    COPD (chronic obstructive pulmonary disease) ([facility] )    Late onset Alzheimer's disease with behavioral disturbance (HCC)    Pneumonia of both lungs due to infectious organism    Acute cystitis with hematuria      Recommended Treatment:     Will increase abilify to 5mg HS  Begin melatonin 3mg HS  Continue Zoloft 50 mg daily  Continue trazodone 25 mg HS  Continue with pharmacotherapy, group therapy, milieu therapy and occupational therapy    The patient will be maintained on the following medications:    Current Facility-Administered Medications:  acetaminophen 650 mg Oral Q6H Juanita Veloz MD   acetaminophen 650 mg Oral Q4H PRN Heather Ewing MD   aluminum-magnesium hydroxide-simethicone 30 mL Oral Q4H PRN Heather Ewing MD   ARIPiprazole 2 mg Oral Daily Ele Ansari MD   artificial tear 2 application Both Eyes TID PRN Juanita Veloz MD   benztropine 1 mg Oral Q6H PRN Heather Ewing MD   calcium carbonate-vitamin D 1 tablet Oral Daily Juanita Veloz MD   docusate sodium 100 mg Oral BID PRN Juanita Veloz MD   furosemide 40 mg Oral Daily Juanita Veloz MD   gabapentin 100 mg Oral BID Juanita Veloz MD   haloperidol 1 mg Oral Q8H PRN Heather Ewing MD   haloperidol lactate 1 mg Intramuscular Q8H PRN Heather Ewing MD   hydrOXYzine HCL 25 mg Oral Q6H PRN Heather Ewing MD   ipratropium 0 5 mg Nebulization Q8H PRN Alesha Stewart, CRNP   isosorbide mononitrate 30 mg Oral Daily Juanita Veloz MD   levalbuterol 1 25 mg Nebulization Q8H PRN Alesha Purcellr, CRNP   levofloxacin 750 mg Oral Q24H Juanita Veloz MD   levothyroxine 150 mcg Oral Early Morning Juanita Veloz MD   magnesium hydroxide 30 mL Oral Daily PRN Heather Ewing MD   metoprolol tartrate 12 5 mg Oral Daily Juanita Veloz MD   nitroglycerin 0 4 mg Sublingual Q5 Min PRN Juanita Veloz MD   nystatin 1 application Topical BID NESHA Seth   potassium chloride 20 mEq Oral BID With Meals [place] Sun MD   pravastatin 20 mg Oral Daily With Ingrid Pond MD   risperiDONE 0 5 mg Oral Q8H PRN Aziza Mccullough MD   rivaroxaban 15 mg Oral Daily With Breakfast [place] Sun MD   sertraline 50 mg Oral Daily Lonnie Hernandez MD   spironolactone 25 mg Oral Daily [place] Sun MD   tiotropium 18 mcg Inhalation Daily [place] Sun MD   traZODone 25 mg Oral HS Lonnie Hernandez MD

## 2020-01-17 NOTE — CASE MANAGEMENT
Met briefly with pt's family -- they report that they've contacted about 6 other SNFs but have not had any luck finding an alternative facility for pt to go to  They are fine with pt returning to [facility] after d/c  They inquired about a possible room change at pt's SNF -- CM will follow up to see if this might be a possibility

## 2020-01-17 NOTE — CONSULTS
Consult Note - Wound   Juliana Kuldeep 80 y o  female MRN: [id number]  Unit/Bed#: Berenice Loving 792-56 Encounter: [id number]      History and Present Illness:  80year old female presented to the hospital for psychiatric evaluation secondary to depression  Patient's history significant for CHF and DM  Assessment Findings:   Patient seen for wound care consultation  Agreeable to assessment  Sitting up in recliner  Requires sit-to-stand to stand for assessment  Incontinent of bowel and bladder  Reports fair appetite--poor po intake at meals per documentation  Breast folds intact  Bilateral buttocks intact with fragile pink, blanchable scar tissue to left medial buttock  Bilateral lower extremities purple with dependent moderate edema likely secondary to vascular congestion  Bilateral heels dry, callused  There is a small round area to right heel where callus has eroded (blanchable), no open area  1  MASD/ITD to midline abdominal fold--partial thickness open area along skin fold  2  MASD/IAD to midline sacrum--partial thickness open area along gluteal fold just distal to coccyx  3  Intertriginous dermatitis to bilateral groin folds with candidiasis  Wound Care Plan:   1-Nystatin powder to bilateral groin folds BID  2-Apply thin layer of Calazime paste to bilateral buttocks and sacrum three times per day and as needed with any incontinence care  3-Cleanse abdominal fold with soap and water, dry well  Place Maxorb to open area along skin fold and cover with ABD until healed  Skin care plans:  1-Hydraguard lotion to bilateral heels BID and PRN  2-Elevate heels to offload pressure  3-Offloading (waffle) cushion in chair when out of bed  4-Moisturize skin daily with skin nourishing cream   5-Turn/reposition every 2 hours while in bed and weight shift frequently while in chair for pressure re-distribution on skin  6-Elevate lower extremities as often as possible to decrease edema      Wound care team to follow  Plan of care reviewed with RN and PCA care team     Wound 01/14/20 Moisture associated skin damage Sacrum Mid (Active)   Wound Description Pink 1/17/2020  3:56 PM   Ethel-wound Assessment Pink; Intact 1/17/2020  3:56 PM   Wound Length (cm) 1 cm 1/17/2020  3:56 PM   Wound Width (cm) 0 3 cm 1/17/2020  3:56 PM   Wound Depth (cm) 0 1 1/17/2020  3:56 PM   Calculated Wound Area (cm^2) 0 3 cm^2 1/17/2020  3:56 PM   Calculated Wound Volume (cm^3) 0 03 cm^3 1/17/2020  3:56 PM   Drainage Amount None 1/17/2020  3:56 PM   Non-staged Wound Description Partial thickness 1/17/2020  3:56 PM   Dressing Open to air 1/17/2020  3:56 PM   Patient Tolerance Tolerated well 1/17/2020  3:56 PM       Wound 01/16/20 Moisture associated skin damage Pelvis Anterior (Active)   Wound Description Pink 1/17/2020  3:56 PM   Ethel-wound Assessment Dry;Clean; Intact 1/17/2020  3:56 PM   Wound Length (cm) 0 5 cm 1/17/2020  3:56 PM   Wound Width (cm) 9 cm 1/17/2020  3:56 PM   Wound Depth (cm) 0 1 1/17/2020  3:56 PM   Calculated Wound Area (cm^2) 4 5 cm^2 1/17/2020  3:56 PM   Calculated Wound Volume (cm^3) 0 45 cm^3 1/17/2020  3:56 PM   Drainage Amount None 1/17/2020  3:56 PM   Non-staged Wound Description Partial thickness 1/17/2020  3:56 PM   Dressing Open to air 1/17/2020  3:56 PM   Patient Tolerance Tolerated well 1/17/2020  3:56 PM       Kristian MARTINEZN, RN, [employer]

## 2020-01-18 LAB
BACTERIA BLD CULT: NORMAL
BACTERIA BLD CULT: NORMAL

## 2020-01-18 PROCEDURE — 99232 SBSQ HOSP IP/OBS MODERATE 35: CPT | Performed by: PSYCHIATRY & NEUROLOGY

## 2020-01-18 RX ORDER — XYLITOL/YERBA SANTA
5 AEROSOL, SPRAY WITH PUMP (ML) MUCOUS MEMBRANE 4 TIMES DAILY PRN
Status: DISCONTINUED | OUTPATIENT
Start: 2020-01-18 | End: 2020-01-19

## 2020-01-18 RX ORDER — GABAPENTIN 100 MG/1
200 CAPSULE ORAL EVERY 12 HOURS SCHEDULED
Status: DISCONTINUED | OUTPATIENT
Start: 2020-01-18 | End: 2020-01-22 | Stop reason: HOSPADM

## 2020-01-18 RX ADMIN — PRAVASTATIN SODIUM 20 MG: 20 TABLET ORAL at 17:30

## 2020-01-18 RX ADMIN — SERTRALINE HYDROCHLORIDE 50 MG: 50 TABLET ORAL at 09:35

## 2020-01-18 RX ADMIN — ISOSORBIDE MONONITRATE 30 MG: 30 TABLET, EXTENDED RELEASE ORAL at 09:35

## 2020-01-18 RX ADMIN — POTASSIUM CHLORIDE 20 MEQ: 20 SOLUTION ORAL at 09:35

## 2020-01-18 RX ADMIN — OYSTER SHELL CALCIUM WITH VITAMIN D 1 TABLET: 500; 200 TABLET, FILM COATED ORAL at 09:34

## 2020-01-18 RX ADMIN — LEVOTHYROXINE SODIUM 150 MCG: 75 TABLET ORAL at 06:16

## 2020-01-18 RX ADMIN — GABAPENTIN 100 MG: 100 CAPSULE ORAL at 09:34

## 2020-01-18 RX ADMIN — ACETAMINOPHEN 650 MG: 325 TABLET ORAL at 17:30

## 2020-01-18 RX ADMIN — ACETAMINOPHEN 650 MG: 325 TABLET ORAL at 01:22

## 2020-01-18 RX ADMIN — TIOTROPIUM BROMIDE 18 MCG: 18 CAPSULE ORAL; RESPIRATORY (INHALATION) at 09:42

## 2020-01-18 RX ADMIN — METOPROLOL TARTRATE 12.5 MG: 25 TABLET ORAL at 09:35

## 2020-01-18 RX ADMIN — ARIPIPRAZOLE 5 MG: 5 TABLET ORAL at 09:36

## 2020-01-18 RX ADMIN — GABAPENTIN 200 MG: 100 CAPSULE ORAL at 20:39

## 2020-01-18 RX ADMIN — NYSTATIN 1 APPLICATION: 100000 POWDER TOPICAL at 17:31

## 2020-01-18 RX ADMIN — SPIRONOLACTONE 25 MG: 25 TABLET ORAL at 09:35

## 2020-01-18 RX ADMIN — ACETAMINOPHEN 650 MG: 325 TABLET ORAL at 12:05

## 2020-01-18 RX ADMIN — ACETAMINOPHEN 650 MG: 325 TABLET ORAL at 06:16

## 2020-01-18 RX ADMIN — MELATONIN TAB 3 MG 3 MG: 3 TAB at 20:39

## 2020-01-18 RX ADMIN — POTASSIUM CHLORIDE 20 MEQ: 20 SOLUTION ORAL at 17:30

## 2020-01-18 RX ADMIN — FUROSEMIDE 40 MG: 40 TABLET ORAL at 09:34

## 2020-01-18 RX ADMIN — HYDROXYZINE HYDROCHLORIDE 25 MG: 25 TABLET ORAL at 12:07

## 2020-01-18 RX ADMIN — RIVAROXABAN 15 MG: 15 TABLET, FILM COATED ORAL at 09:34

## 2020-01-18 RX ADMIN — NYSTATIN 1 APPLICATION: 100000 POWDER TOPICAL at 09:00

## 2020-01-18 NOTE — NURSING NOTE
Patient is calm and cooperative  She reports anxiety and depression  Describes what appears to be tactile hallucinations  She is well oriented  She denies auditory and visual hallucinations describes past experiences of this as psychic moments  Patient' speech is rambling but coherent

## 2020-01-18 NOTE — PLAN OF CARE
Problem: Potential for Falls  Goal: Patient will remain free of falls  Description  INTERVENTIONS:  - Assess patient frequently for physical needs  -  Identify cognitive and physical deficits and behaviors that affect risk of falls    -  [place] fall precautions as indicated by assessment   - Educate patient/family on patient safety including physical limitations  - Instruct patient to call for assistance with activity based on assessment  - Modify environment to reduce risk of injury  - Consider OT/PT consult to assist with strengthening/mobility  Outcome: Progressing     Problem: Prexisting or High Potential for Compromised Skin Integrity  Goal: Skin integrity is maintained or improved  Description  INTERVENTIONS:  - Identify patients at risk for skin breakdown  - Assess and monitor skin integrity  - Assess and monitor nutrition and hydration status  - Monitor labs   - Assess for incontinence   - Turn and reposition patient  - Assist with mobility/ambulation  - Relieve pressure over bony prominences  - Avoid friction and shearing  - Provide appropriate hygiene as needed including keeping skin clean and dry  - Evaluate need for skin moisturizer/barrier cream  - Collaborate with interdisciplinary team   - Patient/family teaching  - Consider wound care consult   Outcome: Progressing     Problem: DEPRESSION  Goal: Will be euthymic at discharge  Description  INTERVENTIONS:  - Administer medication as ordered  - Provide emotional support via 1:1 interaction with staff  - Encourage involvement in milieu/groups/activities  - Monitor for social isolation  Outcome: Progressing     Problem: Alteration in Orientation  Goal: Treatment Goal: Demonstrate a reduction of confusion and improved orientation to person, place, time and/or situation upon discharge, according to optimum baseline/ability  Outcome: Progressing

## 2020-01-18 NOTE — NURSING NOTE
Pt has been up and in her chair all evening, sits out in dayroom and attending group  She complains of severe depression and anxiety  Hopeful for relief after her medications were increased today  She is pleasant and compliant with care and medications

## 2020-01-19 PROCEDURE — 99232 SBSQ HOSP IP/OBS MODERATE 35: CPT | Performed by: PSYCHIATRY & NEUROLOGY

## 2020-01-19 RX ORDER — XYLITOL/YERBA SANTA
5 AEROSOL, SPRAY WITH PUMP (ML) MUCOUS MEMBRANE 4 TIMES DAILY
Status: DISCONTINUED | OUTPATIENT
Start: 2020-01-19 | End: 2020-01-22 | Stop reason: HOSPADM

## 2020-01-19 RX ADMIN — NYSTATIN 1 APPLICATION: 100000 POWDER TOPICAL at 09:00

## 2020-01-19 RX ADMIN — PRAVASTATIN SODIUM 20 MG: 20 TABLET ORAL at 17:24

## 2020-01-19 RX ADMIN — RIVAROXABAN 15 MG: 15 TABLET, FILM COATED ORAL at 08:59

## 2020-01-19 RX ADMIN — OYSTER SHELL CALCIUM WITH VITAMIN D 1 TABLET: 500; 200 TABLET, FILM COATED ORAL at 09:00

## 2020-01-19 RX ADMIN — METOPROLOL TARTRATE 12.5 MG: 25 TABLET ORAL at 09:00

## 2020-01-19 RX ADMIN — ACETAMINOPHEN 650 MG: 325 TABLET ORAL at 17:30

## 2020-01-19 RX ADMIN — MELATONIN TAB 3 MG 3 MG: 3 TAB at 20:51

## 2020-01-19 RX ADMIN — SERTRALINE HYDROCHLORIDE 75 MG: 50 TABLET ORAL at 08:59

## 2020-01-19 RX ADMIN — TIOTROPIUM BROMIDE 18 MCG: 18 CAPSULE ORAL; RESPIRATORY (INHALATION) at 09:29

## 2020-01-19 RX ADMIN — ACETAMINOPHEN 650 MG: 325 TABLET ORAL at 11:55

## 2020-01-19 RX ADMIN — ACETAMINOPHEN 650 MG: 325 TABLET ORAL at 06:43

## 2020-01-19 RX ADMIN — GABAPENTIN 200 MG: 100 CAPSULE ORAL at 20:51

## 2020-01-19 RX ADMIN — FUROSEMIDE 40 MG: 40 TABLET ORAL at 08:58

## 2020-01-19 RX ADMIN — GABAPENTIN 200 MG: 100 CAPSULE ORAL at 08:59

## 2020-01-19 RX ADMIN — SPIRONOLACTONE 25 MG: 25 TABLET ORAL at 08:59

## 2020-01-19 RX ADMIN — POTASSIUM CHLORIDE 20 MEQ: 20 SOLUTION ORAL at 09:29

## 2020-01-19 RX ADMIN — Medication 5 SPRAY: at 11:56

## 2020-01-19 RX ADMIN — NYSTATIN 1 APPLICATION: 100000 POWDER TOPICAL at 17:24

## 2020-01-19 RX ADMIN — POTASSIUM CHLORIDE 20 MEQ: 20 SOLUTION ORAL at 17:25

## 2020-01-19 RX ADMIN — ACETAMINOPHEN 650 MG: 325 TABLET ORAL at 14:02

## 2020-01-19 RX ADMIN — ACETAMINOPHEN 650 MG: 325 TABLET ORAL at 01:21

## 2020-01-19 RX ADMIN — Medication 5 SPRAY: at 22:02

## 2020-01-19 RX ADMIN — LEVOTHYROXINE SODIUM 150 MCG: 75 TABLET ORAL at 06:43

## 2020-01-19 RX ADMIN — ISOSORBIDE MONONITRATE 30 MG: 30 TABLET, EXTENDED RELEASE ORAL at 08:59

## 2020-01-19 RX ADMIN — Medication 5 SPRAY: at 17:24

## 2020-01-19 RX ADMIN — ARIPIPRAZOLE 5 MG: 5 TABLET ORAL at 09:00

## 2020-01-19 NOTE — PLAN OF CARE
Problem: Potential for Falls  Goal: Patient will remain free of falls  Description  INTERVENTIONS:  - Assess patient frequently for physical needs  -  Identify cognitive and physical deficits and behaviors that affect risk of falls    -  [place] fall precautions as indicated by assessment   - Educate patient/family on patient safety including physical limitations  - Instruct patient to call for assistance with activity based on assessment  - Modify environment to reduce risk of injury  - Consider OT/PT consult to assist with strengthening/mobility  Outcome: Progressing     Problem: Prexisting or High Potential for Compromised Skin Integrity  Goal: Skin integrity is maintained or improved  Description  INTERVENTIONS:  - Identify patients at risk for skin breakdown  - Assess and monitor skin integrity  - Assess and monitor nutrition and hydration status  - Monitor labs   - Assess for incontinence   - Turn and reposition patient  - Assist with mobility/ambulation  - Relieve pressure over bony prominences  - Avoid friction and shearing  - Provide appropriate hygiene as needed including keeping skin clean and dry  - Evaluate need for skin moisturizer/barrier cream  - Collaborate with interdisciplinary team   - Patient/family teaching  - Consider wound care consult   Outcome: Progressing     Problem: DEPRESSION  Goal: Will be euthymic at discharge  Description  INTERVENTIONS:  - Administer medication as ordered  - Provide emotional support via 1:1 interaction with staff  - Encourage involvement in milieu/groups/activities  - Monitor for social isolation  Outcome: Progressing     Problem: Alteration in Orientation  Goal: Treatment Goal: Demonstrate a reduction of confusion and improved orientation to person, place, time and/or situation upon discharge, according to optimum baseline/ability  Outcome: Progressing

## 2020-01-19 NOTE — NURSING NOTE
Patient continues to have rambling speech  Patient complained of her lips hurting  [facility] was [facility]

## 2020-01-19 NOTE — PROGRESS NOTES
Progress Note - 1225 [facility] 80 y o  female MRN: [id number]  Unit/Bed#: Kevin Diego 317-43 Encounter: [id number]    The patient was seen for continuing care and reviewed with treatment team  Patient has reportedly been cooperative with all care  She is very somatically preoccupied however, particularly about feeling itchy on her palms and over her chest  Writer encouraged patient to increase PO intake but she complained her lips are too chapped to do this  She has pressured speech, is very tangential, needing frequent redirection making it very difficult relevant topics like her mood  The overall topic seems to be her complaints about staff at the facility she lives at  No reports of SI  Current Mental Status Evaluation:  Appearance: morbidly obese; dry oral mucosa; no rash noted; no abnormal involuntary movements seen  Behavior: mostly calm; no psychomotor agitation/retardation  Speech: pressured, well articulated  Mood: depressed  Affect: anxious, stable, reactive  Thought process: very tangential  Thought content: no delusions elicited; denies SI/HI  Perceptual disturbances: denies AH/VH  Cognition: loosely oriented to all domains  Insight: poor  Judgement: limited      Progress Toward Goals: limited    Principal Problem:    Severe episode of recurrent major depressive disorder, with psychotic features ([facility] )  Active Problems:    Permanent atrial fibrillation    Chronic diastolic congestive heart failure ([facility] )    Morbid obesity (HCC)    Essential hypertension    COPD (chronic obstructive pulmonary disease) ([facility] )    Late onset Alzheimer's disease with behavioral disturbance (HCC)    Pneumonia of both lungs due to infectious organism    Acute cystitis with hematuria      Recommended Treatment: Continue with pharmacotherapy, group therapy, milieu therapy and occupational therapy      - increase zoloft to 75mg po daily, continue abilify 5mg po daily  - ordered mouth kote for dry mouth      Current Facility-Administered Medications:  acetaminophen 650 mg Oral Q6H Shivani Finch MD   acetaminophen 650 mg Oral Q4H PRN Ella Clark MD   aluminum-magnesium hydroxide-simethicone 30 mL Oral Q4H PRN Ella Clark MD   ARIPiprazole 5 mg Oral Daily Edith Going, CRNP   artificial tear 2 application Both Eyes TID PRN Shivani Finch MD   calcium carbonate-vitamin D 1 tablet Oral Daily Shivani Finch MD   docusate sodium 100 mg Oral BID PRN Shivani Finch MD   furosemide 40 mg Oral Daily Shivani Finch MD   gabapentin 200 mg Oral Q12H [facility] Yobany Shay MD   haloperidol 1 mg Oral Q8H PRN Ella Clark MD   haloperidol lactate 1 mg Intramuscular Q8H PRN Ella Clark MD   ipratropium 0 5 mg Nebulization Q8H PRN Deepthi Gallego, NESHA   isosorbide mononitrate 30 mg Oral Daily Shivani Finch MD   levalbuterol 1 25 mg Nebulization Q8H PRN NESHA Loera   levothyroxine 150 mcg Oral Early Morning Shivani Finch MD   magnesium hydroxide 30 mL Oral Daily PRN Ella Clark MD   melatonin 3 mg Oral HS Edith Going, NESHA   metoprolol tartrate 12 5 mg Oral Daily Shivani Finch MD   nitroglycerin 0 4 mg Sublingual Q5 Min PRN Shivani Finch MD   nystatin 1 application Topical BID Sandra An, CRNP   potassium chloride 20 mEq Oral BID With Meals Shivani Finch MD   pravastatin 20 mg Oral Daily With Dinner Shivani Finch MD   rivaroxaban 15 mg Oral Daily With Breakfast Shivani Finch MD   saliva substitute 5 spray Mouth/Throat 4x Daily PRN Yobany Shay MD   [START ON 1/19/2020] sertraline 75 mg Oral Daily Yobany Shay MD   spironolactone 25 mg Oral Daily Shivani Finch MD   tiotropium 18 mcg Inhalation Daily Shivani Finch MD

## 2020-01-19 NOTE — NURSING NOTE
Pt remained to be anxious, but compliant with care and med  She got a bath in a chair last night  Lotion was applied for itchy skin on a back and chest  No SI or signs of aggression noted last night  Pt was sleeping most of the night with no distress noted  O2 was checked at night- was 95

## 2020-01-19 NOTE — PROGRESS NOTES
Progress Note - 1225 [facility] 80 y o  female MRN: [id number]  Unit/Bed#: Rekha Montes 945-28 Encounter: [id number]    The patient was seen for continuing care and reviewed with treatment team  Patient remains anxious, frequently seeking out staff  She is again somatically preoccupied and frequently blames staff for her discomfort  Her complaints are different from yesterday though so she likely does not remember our conversation yesterday  She states she wants to be in a wheelchair to be able to move around more though is anxious about not being able to see well  She complains no one has helped her bathe though this was done yesterday evening  Current Mental Status Evaluation:  Appearance: adequate grooming and hygiene; no abnormal involuntary movements noted   Behavior: somewhat on edge  Speech: wnl  Mood: difficult to assess due to disorganized thought process  Affect: congruent  Thought process: tangential, loose associations, does not answer questions appropriately  Thought content: somatic preoccupation; no delusions elicited but blames staff frequently; denies SI/HI  Perceptual disturbances: denies AH/VH  Cognition: limited orientation  Insight: poor  Judgement: poor    Progress Toward Goals: limited    Principal Problem:    Severe episode of recurrent major depressive disorder, with psychotic features ([facility] )  Active Problems:    Permanent atrial fibrillation    Chronic diastolic congestive heart failure ([facility] )    Morbid obesity ([facility] )    Essential hypertension    COPD (chronic obstructive pulmonary disease) ([facility] )    Late onset Alzheimer's disease with behavioral disturbance ([facility] )    Pneumonia of both lungs due to infectious organism    Acute cystitis with hematuria      Recommended Treatment: Continue with pharmacotherapy, group therapy, milieu therapy and occupational therapy  Continue current medications       Current Facility-Administered Medications:  acetaminophen 650 mg Oral Q6H Yarelis Stokes MD   acetaminophen 650 mg Oral Q4H PRN Hardik Hood MD   aluminum-magnesium hydroxide-simethicone 30 mL Oral Q4H PRN Hardik Hood MD   ARIPiprazole 5 mg Oral Daily NESHA Canales   artificial tear 2 application Both Eyes TID PRN Yarelis Stokes MD   calcium carbonate-vitamin D 1 tablet Oral Daily Yarelis Stokes MD   docusate sodium 100 mg Oral BID PRN Yarelis Stokes MD   furosemide 40 mg Oral Daily Yarelis Stokes MD   gabapentin 200 mg Oral Q12H [facility] Diego Roy MD   haloperidol 1 mg Oral Q8H PRN Hardik Hood MD   haloperidol lactate 1 mg Intramuscular Q8H PRN Hardik Hood MD   ipratropium 0 5 mg Nebulization Q8H PRN NESHA Linda   isosorbide mononitrate 30 mg Oral Daily Yarelis Stokes MD   levalbuterol 1 25 mg Nebulization Q8H PRN NESHA Linda   levothyroxine 150 mcg Oral Early Morning Yarelis Stokes MD   magnesium hydroxide 30 mL Oral Daily PRN Hardik Hood MD   melatonin 3 mg Oral HS NESHA Canales   metoprolol tartrate 12 5 mg Oral Daily Yarelis Stokes MD   nitroglycerin 0 4 mg Sublingual Q5 Min PRN Yarelis Stokes MD   nystatin 1 application Topical BID Kevin Sicard, CRNP   potassium chloride 20 mEq Oral BID With Meals Yarelis Stokes MD   pravastatin 20 mg Oral Daily With Dinner Yarelis Stokes MD   rivaroxaban 15 mg Oral Daily With Breakfast Yarelis Stokes MD   saliva substitute 5 spray Mouth/Throat 4x Daily Diego Roy MD   sertraline 75 mg Oral Daily Diego Roy MD   spironolactone 25 mg Oral Daily Yarelis Stokes MD   tiotropium 18 mcg Inhalation Daily Yarelis Stokes MD

## 2020-01-20 PROCEDURE — 99232 SBSQ HOSP IP/OBS MODERATE 35: CPT | Performed by: PSYCHIATRY & NEUROLOGY

## 2020-01-20 RX ADMIN — SERTRALINE HYDROCHLORIDE 75 MG: 50 TABLET ORAL at 09:57

## 2020-01-20 RX ADMIN — ISOSORBIDE MONONITRATE 30 MG: 30 TABLET, EXTENDED RELEASE ORAL at 09:56

## 2020-01-20 RX ADMIN — ACETAMINOPHEN 650 MG: 325 TABLET ORAL at 06:45

## 2020-01-20 RX ADMIN — METOPROLOL TARTRATE 12.5 MG: 25 TABLET ORAL at 09:55

## 2020-01-20 RX ADMIN — FUROSEMIDE 40 MG: 40 TABLET ORAL at 10:04

## 2020-01-20 RX ADMIN — OYSTER SHELL CALCIUM WITH VITAMIN D 1 TABLET: 500; 200 TABLET, FILM COATED ORAL at 09:56

## 2020-01-20 RX ADMIN — LEVOTHYROXINE SODIUM 150 MCG: 75 TABLET ORAL at 06:43

## 2020-01-20 RX ADMIN — Medication 5 SPRAY: at 17:19

## 2020-01-20 RX ADMIN — POTASSIUM CHLORIDE 20 MEQ: 20 SOLUTION ORAL at 10:33

## 2020-01-20 RX ADMIN — PRAVASTATIN SODIUM 20 MG: 20 TABLET ORAL at 17:20

## 2020-01-20 RX ADMIN — Medication 5 SPRAY: at 12:24

## 2020-01-20 RX ADMIN — SPIRONOLACTONE 25 MG: 25 TABLET ORAL at 10:04

## 2020-01-20 RX ADMIN — ACETAMINOPHEN 650 MG: 325 TABLET ORAL at 12:29

## 2020-01-20 RX ADMIN — ACETAMINOPHEN 650 MG: 325 TABLET ORAL at 01:25

## 2020-01-20 RX ADMIN — TIOTROPIUM BROMIDE 18 MCG: 18 CAPSULE ORAL; RESPIRATORY (INHALATION) at 10:13

## 2020-01-20 RX ADMIN — Medication 5 SPRAY: at 10:21

## 2020-01-20 RX ADMIN — ACETAMINOPHEN 650 MG: 325 TABLET ORAL at 17:31

## 2020-01-20 RX ADMIN — GABAPENTIN 200 MG: 100 CAPSULE ORAL at 10:04

## 2020-01-20 RX ADMIN — NYSTATIN 1 APPLICATION: 100000 POWDER TOPICAL at 17:19

## 2020-01-20 RX ADMIN — ARIPIPRAZOLE 5 MG: 5 TABLET ORAL at 09:55

## 2020-01-20 RX ADMIN — RIVAROXABAN 15 MG: 15 TABLET, FILM COATED ORAL at 09:57

## 2020-01-20 NOTE — PROGRESS NOTES
Pt more interactive today when prompted to engage in groups  She was pleasant and appropriate when seated near peers  01/20/20 1000 01/20/20 1130 01/20/20 1400   Activity/Group Checklist   Group [facility]  (Greet the day) Spiritual resources Life Skills  (genrational trivia game )   Attendance Attended Attended Attended   Attendance Duration (min) 16-30  --  31-45   Interactions Interacted appropriately  (Discussed being kind to others who are unkind to you)  --  Interacted appropriately   Affect/Mood Appropriate  --  Appropriate;Normal range   Goals Achieved Able to listen to others; Able to engage in interactions; Identified feelings  --  Able to engage in interactions

## 2020-01-20 NOTE — PROGRESS NOTES
Progress Note - 1225 [facility] 80 y o  female MRN: [id number]  Unit/Bed#: [facility] 041-46 Encounter: [id number]    The patient was seen for continuing care and reviewed with treatment team   She does not have any particular complaints  She is pleasantly confused  Last night she had a restless night and could not sleep through  Her appetite has been generally adequate  Current Mental Status Evaluation:  Appearance:  Adequate hygiene and grooming and Good eye contact   Behavior:  calm and cooperative   Mood:  euthymic   Affect: appropriate   Speech: Sparse and Soft   Thought Process:  Goal directed and coherent   Thought Content:  Does not verbalize delusional material   Perceptual Disturbances: Denies hallucinations and does not appear to be responding to internal stimuli   Risk Potential: No suicidal or homicidal ideation   Orientation:        Progress Toward Goals:  Slowly improving  Principal Problem:    Severe episode of recurrent major depressive disorder, with psychotic features ([facility] )  Active Problems:    Permanent atrial fibrillation    Chronic diastolic congestive heart failure ([facility] )    Morbid obesity ([facility] )    Essential hypertension    COPD (chronic obstructive pulmonary disease) ([facility] )    Late onset Alzheimer's disease with behavioral disturbance ([facility] )    Pneumonia of both lungs due to infectious organism    Acute cystitis with hematuria      Recommended Treatment: Continue with pharmacotherapy, group therapy, milieu therapy and occupational therapy    The patient will be maintained on the following medications:    Current Facility-Administered Medications:  acetaminophen 650 mg Oral Q6H David Levy MD   acetaminophen 650 mg Oral Q4H PRN Khushbu Medina MD   aluminum-magnesium hydroxide-simethicone 30 mL Oral Q4H PRN Khushbu Medina MD   ARIPiprazole 5 mg Oral Daily NESHA Womack   artificial tear 2 application Both Eyes TID PRN David Levy MD   calcium carbonate-vitamin D 1 tablet Oral Daily Prakash Quezada MD   docusate sodium 100 mg Oral BID PRN Prakash Quezada MD   furosemide 40 mg Oral Daily Prakash Quezada MD   gabapentin 200 mg Oral Q12H [address] Gerardo Costello MD   haloperidol 1 mg Oral Q8H PRN Danika Whitmore MD   haloperidol lactate 1 mg Intramuscular Q8H PRN Danika Whitmore MD   ipratropium 0 5 mg Nebulization Q8H PRN NESHA Velez   isosorbide mononitrate 30 mg Oral Daily Prakash Quezada MD   levalbuterol 1 25 mg Nebulization Q8H PRN NESHA Velez   levothyroxine 150 mcg Oral Early Morning Prakash Quezada MD   magnesium hydroxide 30 mL Oral Daily PRN Danika Whitmore MD   melatonin 3 mg Oral HS Reece Farfan, NESHA   metoprolol tartrate 12 5 mg Oral Daily Prakash Quezada MD   nitroglycerin 0 4 mg Sublingual Q5 Min PRN Prakash Quezada MD   nystatin 1 application Topical BID NESHA Saucedo   potassium chloride 20 mEq Oral BID With Meals Prakash Quezada MD   pravastatin 20 mg Oral Daily With Dinner Prakash Quezada MD   rivaroxaban 15 mg Oral Daily With Breakfast Prakash Quezada MD   saliva substitute 5 spray Mouth/Throat 4x Daily Gerardo Costello MD   sertraline 75 mg Oral Daily Gerardo Costello MD   spironolactone 25 mg Oral Daily Prakash Quezada MD   tiotropium 18 mcg Inhalation Daily Prakash Quezada MD

## 2020-01-20 NOTE — PROGRESS NOTES
01/20/20 0858   Team Meeting   Meeting Type Daily Rounds   Team Members Present   Team Members Present Physician;Nurse;; Other (Discipline and Name)   Physician Team Member Isabell Mauro   [facility] Team Member Albertina Amezcua   Other (Discipline and Name) Anisha Tinsley        01/20/20 6176   Team Meeting   Meeting Type Daily Rounds   Team Members Present   Team Members Present Physician;Nurse;; Other (Discipline and Name)   Physician Team Member Isabell Mauro   Nursing Team Member [facility] Management Team Member Albertina Her   Other (Discipline and Name) Anisha Tinsley     Reviewed case with team  Last covered day tomorrow  D/C Wednesday

## 2020-01-20 NOTE — CASE MANAGEMENT
Left  for Octavio Pennington at [facility] [phone] to discuss pt's d/c plan for Wed    Spoke with pt's daughter about d/c plan for Wed  Asher Watson reports that pt looks and seems a lot better  She thinks that pt is calmer, brighter and happier  She's spoken with SNF staff and requested that they provide talk therapy for pt after her return  Asher Watson is also advocating for pt's room to be switched at SNF

## 2020-01-20 NOTE — PLAN OF CARE
Problem: Potential for Falls  Goal: Patient will remain free of falls  Description  INTERVENTIONS:  - Assess patient frequently for physical needs  -  Identify cognitive and physical deficits and behaviors that affect risk of falls    -  Mobile fall precautions as indicated by assessment   - Educate patient/family on patient safety including physical limitations  - Instruct patient to call for assistance with activity based on assessment  - Modify environment to reduce risk of injury  - Consider OT/PT consult to assist with strengthening/mobility  Outcome: Progressing     Problem: Prexisting or High Potential for Compromised Skin Integrity  Goal: Skin integrity is maintained or improved  Description  INTERVENTIONS:  - Identify patients at risk for skin breakdown  - Assess and monitor skin integrity  - Assess and monitor nutrition and hydration status  - Monitor labs   - Assess for incontinence   - Turn and reposition patient  - Assist with mobility/ambulation  - Relieve pressure over bony prominences  - Avoid friction and shearing  - Provide appropriate hygiene as needed including keeping skin clean and dry  - Evaluate need for skin moisturizer/barrier cream  - Collaborate with interdisciplinary team   - Patient/family teaching  - Consider wound care consult   Outcome: Progressing     Problem: DEPRESSION  Goal: Will be euthymic at discharge  Description  INTERVENTIONS:  - Administer medication as ordered  - Provide emotional support via 1:1 interaction with staff  - Encourage involvement in milieu/groups/activities  - Monitor for social isolation  Outcome: Progressing     Problem: Alteration in Orientation  Goal: Treatment Goal: Demonstrate a reduction of confusion and improved orientation to person, place, time and/or situation upon discharge, according to optimum baseline/ability  Outcome: Progressing     Problem: Ineffective Coping  Goal: Participates in unit activities  Description  Interventions:  - Provide therapeutic environment   - Provide required programming   - Redirect inappropriate behaviors   Outcome: Progressing     Problem: DISCHARGE PLANNING  Goal: Discharge to home or other facility with appropriate resources  Description  INTERVENTIONS:  - Identify barriers to discharge w/patient and caregiver  - Arrange for needed discharge resources and transportation as appropriate  - Identify discharge learning needs (meds, wound care, etc )  - Arrange for interpretive services to assist at discharge as needed  - Refer to Case Management Department for coordinating discharge planning if the patient needs post-hospital services based on physician/advanced practitioner order or complex needs related to functional status, cognitive ability, or social support system  Outcome: Progressing

## 2020-01-21 PROCEDURE — 99232 SBSQ HOSP IP/OBS MODERATE 35: CPT | Performed by: PSYCHIATRY & NEUROLOGY

## 2020-01-21 RX ORDER — ARIPIPRAZOLE 10 MG/1
10 TABLET ORAL DAILY
Status: DISCONTINUED | OUTPATIENT
Start: 2020-01-22 | End: 2020-01-22 | Stop reason: HOSPADM

## 2020-01-21 RX ADMIN — HALOPERIDOL 1 MG: 0.5 TABLET ORAL at 00:24

## 2020-01-21 RX ADMIN — GABAPENTIN 200 MG: 100 CAPSULE ORAL at 21:12

## 2020-01-21 RX ADMIN — SPIRONOLACTONE 25 MG: 25 TABLET ORAL at 09:33

## 2020-01-21 RX ADMIN — LEVOTHYROXINE SODIUM 150 MCG: 75 TABLET ORAL at 06:06

## 2020-01-21 RX ADMIN — ACETAMINOPHEN 650 MG: 325 TABLET ORAL at 23:31

## 2020-01-21 RX ADMIN — ISOSORBIDE MONONITRATE 30 MG: 30 TABLET, EXTENDED RELEASE ORAL at 09:30

## 2020-01-21 RX ADMIN — Medication 5 SPRAY: at 12:20

## 2020-01-21 RX ADMIN — ACETAMINOPHEN 650 MG: 325 TABLET ORAL at 12:23

## 2020-01-21 RX ADMIN — ACETAMINOPHEN 650 MG: 325 TABLET ORAL at 17:30

## 2020-01-21 RX ADMIN — RIVAROXABAN 15 MG: 15 TABLET, FILM COATED ORAL at 12:20

## 2020-01-21 RX ADMIN — ARIPIPRAZOLE 5 MG: 5 TABLET ORAL at 09:31

## 2020-01-21 RX ADMIN — FUROSEMIDE 40 MG: 40 TABLET ORAL at 09:32

## 2020-01-21 RX ADMIN — GABAPENTIN 200 MG: 100 CAPSULE ORAL at 09:34

## 2020-01-21 RX ADMIN — TIOTROPIUM BROMIDE 18 MCG: 18 CAPSULE ORAL; RESPIRATORY (INHALATION) at 09:27

## 2020-01-21 RX ADMIN — POTASSIUM CHLORIDE 20 MEQ: 20 SOLUTION ORAL at 09:43

## 2020-01-21 RX ADMIN — SERTRALINE HYDROCHLORIDE 75 MG: 50 TABLET ORAL at 09:32

## 2020-01-21 RX ADMIN — METOPROLOL TARTRATE 12.5 MG: 25 TABLET ORAL at 09:32

## 2020-01-21 RX ADMIN — MELATONIN TAB 3 MG 3 MG: 3 TAB at 19:49

## 2020-01-21 RX ADMIN — Medication 5 SPRAY: at 21:12

## 2020-01-21 RX ADMIN — OYSTER SHELL CALCIUM WITH VITAMIN D 1 TABLET: 500; 200 TABLET, FILM COATED ORAL at 09:31

## 2020-01-21 RX ADMIN — PRAVASTATIN SODIUM 20 MG: 20 TABLET ORAL at 17:29

## 2020-01-21 RX ADMIN — ACETAMINOPHEN 650 MG: 325 TABLET ORAL at 06:06

## 2020-01-21 RX ADMIN — Medication 5 SPRAY: at 17:30

## 2020-01-21 RX ADMIN — POTASSIUM CHLORIDE 20 MEQ: 20 SOLUTION ORAL at 17:29

## 2020-01-21 NOTE — NURSING NOTE
Patient displaying agitation; throwing blankets on floor and yelling that she is cold and wet  Also pulling at side rail saying she wants wet pad remove because it is hurting her back and that is is dirty  Also rubbing abdominal fold saying there is dirt there and she cannot stand it  Both blanket and pad are dry; abdominal fold clean  Patient repositioned, pillow placed under legs to elevate heels, refused scheduled tylenol, refused mouth care and water, blankets replaced  Patient continues to call out and shake side rail saying she cannot stand to lay in a dirty bed  Medicated with prn po Haldol

## 2020-01-21 NOTE — CASE MANAGEMENT
Spoke to NASRIN [employer] from [facility] who reports that Kurt Noonan will be out tomorrow morning to complete optioning assessment  Because pt is a Level II, CM will need to wait for letter from [place] [phone] office as SNF will need this prior to accepting pt back  [employer] will make this a priority so everything gets submitted quickly   CM will update pt's daughter and SNF

## 2020-01-21 NOTE — CASE MANAGEMENT
Spoke to Victoria who states that pt must be optioned before she returns to the facility  Connor Tang completed and faxed to [address] [place] [address] left for Shellie Gonzalez at [facility] to see if assess can be expedited so d/c is not held up  Spoke with pt's daughter/POA, Florentin Araujo, to make her aware of the situation with optioning paperwork and possible hold up with pt's d/c  Florentin Araujo understands situation and protocol  She is hopeful that things will work out so pt can still return tomorrow  CM will keep her posted

## 2020-01-21 NOTE — CASE MANAGEMENT
[phone] to plan for pt's d/c tomorrow

## 2020-01-21 NOTE — NURSING NOTE
Patient in dayroom for dinner and compliant with medications  Patient was showered early in afternoon, and doppler used to confirm pulses on swollen extremities; Patient pleasant with staff, stating she will miss us when she leaves

## 2020-01-21 NOTE — CASE MANAGEMENT
Spoke with Erik Sánchez at [facility] [phone] regarding pt's anticipated d/c to see if they would accept pt back prior to receiving state letter  Erik Sánchez said it was fine and trusts that CM will send her the official documentation as soon as it's received but she doesn't want this to hold up the d/c  Pt is allowed to return to facility after 1pm tomorrow  CM will consult doctor and tx team to find out if pt is still ready for d/c  If so, CM will schedule transport and make family aware    [facility] transport scheduled for tomorrow at 12:30pm via [address] made aware that pt will be d/c'ed tomorrow afternoon     Pt's daughter updated about d/c plan for tomorrow  She is agreeable

## 2020-01-21 NOTE — PROGRESS NOTES
01/21/20 0839   Team Meeting   Meeting Type Daily Rounds   Team Members Present   Team Members Present Physician;Nurse;;Occupational Therapist;Other (Discipline and Name)   Physician Team Member Leola Gómez   Nursing Team Member [facility] Management Team Member Marie Rodney   OT Team Member Dax Ruiz   Other (Discipline and Name) Steven Dempsey     Reviewed case with team  D/C tomorrow back to [facility]  Last covered day today

## 2020-01-21 NOTE — PROGRESS NOTES
Calm  Upon approach yet less interactive in structured seated exercise         01/21/20 1100   Activity/Group Checklist   Group Exercise   Attendance Attended   Attendance Duration (min) 0-15   Interactions Did not interact  (was excused with assist for toileting )   Affect/Mood Calm   Goals Achieved Able to listen to others

## 2020-01-21 NOTE — NURSING NOTE
Patient compliant with all medications and bright upon approach  Patient ate breakfast by herself in the dayroom and currently denies any symptoms  Patient states she slept well

## 2020-01-21 NOTE — NURSING NOTE
Pt labile and irritable all shift  Refused both 1800 and hs meds after several attempts  Denies all sx

## 2020-01-21 NOTE — NURSING NOTE
Patient's legs appear purple in color and are cool to touch  SLIM notified and orders requested for ADRIANA socks, SCDs, and nursing orders  Doppler used to confirm pedal pulses bilaterally

## 2020-01-21 NOTE — PROGRESS NOTES
Progress Note - 1225 [facility] 80 y o  female MRN: [id number]  Unit/Bed#: Joyce Marlow 302-89 Encounter: [id number]    The patient was seen for continuing care and reviewed with treatment team Has a number of complaints about her dry/itching skin and also not liking the material covering her bed  Had a bad night  She slept poorly  This morning she is confused with disorganized thought process and loosely connected associations  Talking about a little boy rescuing her from [facility]  She then started crying stating that she has been doing  a lot of that  Current Mental Status Evaluation:  Appearance:  Adequate hygiene and grooming and Good eye contact   Behavior:  calm   Mood:  depressed and dysphoric   Affect: constricted, appropriate and Tearful   Speech: Pressured   Thought Process: Tangential and disorganized   Thought Content:  No systematized delusion    Perceptual Disturbances: Uncertain   Risk Potential: No suicidal or homicidal ideation   Orientation:        Progress Toward Goals: Some regression noted  Principal Problem:    Severe episode of recurrent major depressive disorder, with psychotic features ([facility] )  Active Problems:    Permanent atrial fibrillation    Chronic diastolic congestive heart failure (HCC)    Morbid obesity (HCC)    Essential hypertension    COPD (chronic obstructive pulmonary disease) ([facility] )    Late onset Alzheimer's disease with behavioral disturbance (HCC)    Pneumonia of both lungs due to infectious organism    Acute cystitis with hematuria      Recommended Treatment: Increase Abilify  Continue with pharmacotherapy, group therapy, milieu therapy and occupational therapy    The patient will be maintained on the following medications:    Current Facility-Administered Medications:  acetaminophen 650 mg Oral Q6H Nick Eldridge MD   acetaminophen 650 mg Oral Q4H PRN Chintan Bose MD   aluminum-magnesium hydroxide-simethicone 30 mL Oral Q4H PRN Chintan Bose MD ARIPiprazole 5 mg Oral Daily NESHA Johns   artificial tear 2 application Both Eyes TID PRN Patrick Brody MD   calcium carbonate-vitamin D 1 tablet Oral Daily Patrick Brody MD   docusate sodium 100 mg Oral BID PRN Patrick Brody MD   furosemide 40 mg Oral Daily Patrick Brody MD   gabapentin 200 mg Oral Q12H [facility] Hamilton Long MD   haloperidol 1 mg Oral Q8H PRN Satinder Russell MD   haloperidol lactate 1 mg Intramuscular Q8H PRN Satinder Russell MD   ipratropium 0 5 mg Nebulization Q8H PRN NESHA Phelan   isosorbide mononitrate 30 mg Oral Daily Patrick Brody MD   levalbuterol 1 25 mg Nebulization Q8H PRN NESHA Phelan   levothyroxine 150 mcg Oral Early Morning Patrick Brody MD   magnesium hydroxide 30 mL Oral Daily PRN Satinder Russell MD   melatonin 3 mg Oral HS NESHA Johns   metoprolol tartrate 12 5 mg Oral Daily Patrick Brody MD   nitroglycerin 0 4 mg Sublingual Q5 Min PRN Patrick Brody MD   nystatin 1 application Topical BID NESHA Mckoy   potassium chloride 20 mEq Oral BID With Meals Patrick Brody MD   pravastatin 20 mg Oral Daily With Dinner Patrick Brody MD   rivaroxaban 15 mg Oral Daily With Breakfast Patrick Brody MD   saliva substitute 5 spray Mouth/Throat 4x Daily Hamilton Long MD   sertraline 75 mg Oral Daily Hamilton Long MD   spironolactone 25 mg Oral Daily Patrick Brody MD   tiotropium 18 mcg Inhalation Daily Patrick Brody MD

## 2020-01-22 VITALS
OXYGEN SATURATION: 95 % | BODY MASS INDEX: 37.77 KG/M2 | TEMPERATURE: 97.7 F | HEART RATE: 78 BPM | HEIGHT: 69 IN | RESPIRATION RATE: 20 BRPM | DIASTOLIC BLOOD PRESSURE: 84 MMHG | SYSTOLIC BLOOD PRESSURE: 136 MMHG | WEIGHT: 254.98 LBS

## 2020-01-22 PROCEDURE — NC001 PR NO CHARGE: Performed by: PSYCHIATRY & NEUROLOGY

## 2020-01-22 PROCEDURE — 99238 HOSP IP/OBS DSCHRG MGMT 30/<: CPT | Performed by: PSYCHIATRY & NEUROLOGY

## 2020-01-22 RX ORDER — DOCUSATE SODIUM 100 MG/1
100 CAPSULE, LIQUID FILLED ORAL 2 TIMES DAILY PRN
Qty: 10 CAPSULE | Refills: 0 | Status: SHIPPED | OUTPATIENT
Start: 2020-01-22

## 2020-01-22 RX ORDER — LANOLIN ALCOHOL/MO/W.PET/CERES
3 CREAM (GRAM) TOPICAL
Qty: 30 TABLET | Refills: 0 | Status: SHIPPED | OUTPATIENT
Start: 2020-01-22 | End: 2020-02-21

## 2020-01-22 RX ORDER — GABAPENTIN 100 MG/1
200 CAPSULE ORAL EVERY 12 HOURS SCHEDULED
Qty: 120 CAPSULE | Refills: 0 | Status: SHIPPED | OUTPATIENT
Start: 2020-01-22 | End: 2020-02-21

## 2020-01-22 RX ORDER — ARIPIPRAZOLE 10 MG/1
10 TABLET ORAL DAILY
Qty: 30 TABLET | Refills: 0 | Status: SHIPPED | OUTPATIENT
Start: 2020-01-22 | End: 2020-02-21

## 2020-01-22 RX ORDER — SERTRALINE HYDROCHLORIDE 25 MG/1
75 TABLET, FILM COATED ORAL DAILY
Qty: 90 TABLET | Refills: 0 | Status: SHIPPED | OUTPATIENT
Start: 2020-01-22 | End: 2020-02-21

## 2020-01-22 RX ORDER — LORAZEPAM 0.5 MG/1
0.5 TABLET ORAL EVERY 6 HOURS PRN
Qty: 30 TABLET | Refills: 0 | Status: SHIPPED | OUTPATIENT
Start: 2020-01-22 | End: 2020-01-22 | Stop reason: HOSPADM

## 2020-01-22 RX ADMIN — ARIPIPRAZOLE 10 MG: 10 TABLET ORAL at 09:21

## 2020-01-22 RX ADMIN — FUROSEMIDE 40 MG: 40 TABLET ORAL at 09:24

## 2020-01-22 RX ADMIN — TIOTROPIUM BROMIDE 18 MCG: 18 CAPSULE ORAL; RESPIRATORY (INHALATION) at 09:33

## 2020-01-22 RX ADMIN — Medication 5 SPRAY: at 09:16

## 2020-01-22 RX ADMIN — GABAPENTIN 200 MG: 100 CAPSULE ORAL at 09:19

## 2020-01-22 RX ADMIN — POTASSIUM CHLORIDE 20 MEQ: 20 SOLUTION ORAL at 09:15

## 2020-01-22 RX ADMIN — METOPROLOL TARTRATE 12.5 MG: 25 TABLET ORAL at 09:25

## 2020-01-22 RX ADMIN — RIVAROXABAN 15 MG: 15 TABLET, FILM COATED ORAL at 09:16

## 2020-01-22 RX ADMIN — SPIRONOLACTONE 25 MG: 25 TABLET ORAL at 09:20

## 2020-01-22 RX ADMIN — OYSTER SHELL CALCIUM WITH VITAMIN D 1 TABLET: 500; 200 TABLET, FILM COATED ORAL at 09:20

## 2020-01-22 RX ADMIN — SERTRALINE HYDROCHLORIDE 75 MG: 50 TABLET ORAL at 09:21

## 2020-01-22 RX ADMIN — ACETAMINOPHEN 650 MG: 325 TABLET ORAL at 05:58

## 2020-01-22 RX ADMIN — LEVOTHYROXINE SODIUM 150 MCG: 75 TABLET ORAL at 05:58

## 2020-01-22 NOTE — PLAN OF CARE
Problem: Potential for Falls  Goal: Patient will remain free of falls  Description  INTERVENTIONS:  - Assess patient frequently for physical needs  -  Identify cognitive and physical deficits and behaviors that affect risk of falls    -  [place] fall precautions as indicated by assessment   - Educate patient/family on patient safety including physical limitations  - Instruct patient to call for assistance with activity based on assessment  - Modify environment to reduce risk of injury  - Consider OT/PT consult to assist with strengthening/mobility  Outcome: Adequate for Discharge     Problem: Prexisting or High Potential for Compromised Skin Integrity  Goal: Skin integrity is maintained or improved  Description  INTERVENTIONS:  - Identify patients at risk for skin breakdown  - Assess and monitor skin integrity  - Assess and monitor nutrition and hydration status  - Monitor labs   - Assess for incontinence   - Turn and reposition patient  - Assist with mobility/ambulation  - Relieve pressure over bony prominences  - Avoid friction and shearing  - Provide appropriate hygiene as needed including keeping skin clean and dry  - Evaluate need for skin moisturizer/barrier cream  - Collaborate with interdisciplinary team   - Patient/family teaching  - Consider wound care consult   Outcome: Adequate for Discharge     Problem: DEPRESSION  Goal: Will be euthymic at discharge  Description  INTERVENTIONS:  - Administer medication as ordered  - Provide emotional support via 1:1 interaction with staff  - Encourage involvement in milieu/groups/activities  - Monitor for social isolation  Outcome: Adequate for Discharge     Problem: Alteration in Orientation  Goal: Treatment Goal: Demonstrate a reduction of confusion and improved orientation to person, place, time and/or situation upon discharge, according to optimum baseline/ability  Outcome: Adequate for Discharge

## 2020-01-22 NOTE — NURSING NOTE
Attempted to call report to receiving facility, was told was being transferred to the nurses station; Phone then rang for two minutes with no answer  Will try again

## 2020-01-22 NOTE — PLAN OF CARE
Discharge planning discussed with pt, pt's daughter, and SNF staff  BLS transportation arranged  IMM letter signed by pt's daughter  Med necessity form completed and placed in d/c folder  Follow-up psych and therapy will be provided by [facility] upon pt's return to facility         D/C back to [facility] on 1/22 at 12:30pm via [facility]   Phone # for report: [phone]  Fax #: [phone]

## 2020-01-22 NOTE — DISCHARGE SUMMARY
Discharge Summary - [address] [facility] 80 y o  female MRN: [id number]  Unit/Bed#: Pia Nash 089-37 Encounter: [id number]     Admission Date: 1/13/2020         Discharge Date:  January 22, 2020  Attending Psychiatrist: Maureen Redd MD    Reason for Admission/HPI:     This 80years old   female has had progressive cognitive decline since an unknown date and she also has a history of recurrent episodes of major depression with previous hospitalizations  In the past week or so, the family noted increased depressive symptoms with crying spells and death wishes and paranoid delusions and auditory and visual hallucinations and perhaps illusions  The patient has a history of multiple hospitalizations  In her 25s, she was hospitalized and received ECT  Because of her cognitive decline, she cannot provide any reliable information      Mental status examination reveals an elderly  female who is dressed in hospital gown, somewhat disheveled  She has good eye contact and interacts well  Her speech is soft and hypophonic but has a normal rate  At times she has a tendency to derail but is coherent for the most part  She is mistrustful and paranoid  She reports auditory and visual hallucinations and illusions  She is oriented to person, knows the month but not the year, knows the president [facility]  Meds/Allergies     all current active meds have been reviewed    Allergies   Allergen Reactions    Vancomycin Rash    Iodinated Diagnostic Agents     Other      dyes    Cefepime Rash       Objective     Vital signs in last 24 hours:  Temp:  [97 7 °F (36 5 °C)-98 5 °F (36 9 °C)] 97 7 °F (36 5 °C)  HR:  [78-90] 78  Resp:  [18-20] 20  BP: (117-136)/(60-84) 136/84      Intake/Output Summary (Last 24 hours) at 1/22/2020 1130  Last data filed at 1/22/2020 8823  Gross per 24 hour   Intake 1130 ml   Output 1 ml   Net 1129 ml       Hospital Course:      The patient was admitted to the inpatient psychiatric unit and started on every 15 minutes precautions  A treatment plan was formed with focus on pharmacotherapy and milieu therapy, group therapy and individual psychotherapy when indicated  Psychiatric medications were titrated over the hospital stay and milieu therapy was utilized  To address depressive symptoms and aggressive tendencies, the patient was started on antidepressant Zoloft and antipsychotic medication Abilify  Medication doses were titrated during the hospital course  I met with the patient's family and discussed treatment plan  The patient was also treated for UTI with antibiotics      Patient's symptoms improved gradually over the hospital course  At the end of treatment the patient was doing well  Mood was stable at the time of discharge  The patient denied suicidal ideation, intent or plan at the time of discharge and denied homicidal ideation, intent or plan at the time of discharge  There was no overt psychosis at the time of discharge  Sleep and appetite were improved  The patient was tolerating medications and was not reporting any significant side effects at the time of discharge  Since the patient was doing well at the end of the hospitalization, treatment team felt that the patient could be safely discharged to outpatient care  The outpatient follow up with a psychiatrist was arranged by the unit  upon discharge      Mental Status at Time of Discharge:   Appearance:  Adequate hygiene and grooming and Good eye contact   Behavior:  calm and cooperative   Speech:   Language: Normal rate and Soft  No overt abnormality   Mood:  euthymic   Affect:   Associations: appropriate  Tightly connected   Thought Process:  Goal directed and coherent   Thought Content:  Does not verbalize delusional material   Perceptual Disturbances: Denies hallucinations and does not appear to be responding to internal stimuli     Risk Potential: No suicidal or homicidal ideation   Orientation   Language Oriented to place and person  anomia No   Memory  Fund of knowledge Short term impaired  Not assessed   Attention/Concentration attention span and concentration were age appropriate   Insight:  limited   Judgment: Good judgment   Gait/Station: The patient is nonambulatory   Motor Activity: No abnormal movement noted       Admission Diagnosis:  Principal Problem:    Severe episode of recurrent major depressive disorder, with psychotic features ([facility] [address] )  Active Problems:    Permanent atrial fibrillation    Chronic diastolic congestive heart failure ([facility] [address] )    Morbid obesity ([facility] [address] )    Essential hypertension    COPD (chronic obstructive pulmonary disease) ([facility] [address] )    Late onset Alzheimer's disease with behavioral disturbance ([facility])    Pneumonia of both lungs due to infectious organism    Acute cystitis with hematuria      Discharge Diagnosis:     Principal Problem:    Severe episode of recurrent major depressive disorder, with psychotic features ([facility] [address] )  Active Problems:    Permanent atrial fibrillation    Chronic diastolic congestive heart failure ([facility] [address] )    Morbid obesity ([facility] [address] )    Essential hypertension    COPD (chronic obstructive pulmonary disease) ([facility] [address] )    Late onset Alzheimer's disease with behavioral disturbance ([facility] [address] )    Pneumonia of both lungs due to infectious organism    Acute cystitis with hematuria  Resolved Problems:    * No resolved hospital problems   *      Lab results:    Admission on 01/13/2020   Component Date Value    Sodium 01/13/2020 139     Potassium 01/13/2020 3 8     Chloride 01/13/2020 99     CO2 01/13/2020 30     ANION GAP 01/13/2020 10     BUN 01/13/2020 18     Creatinine 01/13/2020 1 01     Glucose 01/13/2020 98     Calcium 01/13/2020 9 3     AST 01/13/2020 19     ALT 01/13/2020 20     Alkaline Phosphatase 01/13/2020 63     Total Protein 01/13/2020 7 9     Albumin 01/13/2020 3 9     Total Bilirubin 01/13/2020 0 60     eGFR 01/13/2020 51*    WBC 01/13/2020 6 00     RBC 01/13/2020 5 03     Hemoglobin 01/13/2020 15 5     Hematocrit 01/13/2020 46 1*    MCV 01/13/2020 92     MCH 01/13/2020 30 8     MCHC 01/13/2020 33 7     RDW 01/13/2020 13 9     MPV 01/13/2020 7 4*    Platelets 03/35/7968 195     Neutrophils Relative 01/13/2020 73*    Lymphocytes Relative 01/13/2020 16*    Monocytes Relative 01/13/2020 8     Eosinophils Relative 01/13/2020 3     Basophils Relative 01/13/2020 0     Neutrophils Absolute 01/13/2020 4 40     Lymphocytes Absolute 01/13/2020 0 90     Monocytes Absolute 01/13/2020 0 50     Eosinophils Absolute 01/13/2020 0 10     Basophils Absolute 01/13/2020 0 00     TSH 3RD GENERATON 01/13/2020 0 524     Troponin I 01/13/2020 <0 01     Ethanol Lvl 01/13/2020 <10     Color, UA 01/13/2020 Yellow     Clarity, UA 01/13/2020 Cloudy*    Specific Gravity, UA 01/13/2020 1 010     pH, UA 01/13/2020 6 5     Leukocytes, UA 01/13/2020 500 0*    Nitrite, UA 01/13/2020 Negative     Protein, UA 01/13/2020 15 (Trace)*    Glucose, UA 01/13/2020 Negative     Ketones, UA 01/13/2020 Negative     Bilirubin, UA 01/13/2020 Negative     Blood, UA 01/13/2020 25 0*    UROBILINOGEN UA 01/13/2020 Negative     Amph/Meth UR 01/13/2020 Negative     Barbiturate Ur 01/13/2020 Negative     Benzodiazepine Urine 01/13/2020 Negative     Cocaine Urine 01/13/2020 Negative     Methadone Urine 01/13/2020 Negative     Opiate Urine 01/13/2020 Negative     PCP Ur 01/13/2020 Negative     THC Urine 01/13/2020 Negative     RBC, UA 01/13/2020 0-1*    WBC, UA 01/13/2020 Innumerable*    Epithelial Cells 01/13/2020 Occasional     Bacteria, UA 01/13/2020 Innumerable*    Blood Culture 01/13/2020 No Growth After 5 Days   Blood Culture 01/13/2020 No Growth After 5 Days       Urine Culture 01/13/2020 50,000-59,000 cfu/ml Beta Hemolytic Streptococcus Group B*    Urine Culture 01/13/2020 7641-2082 cfu/ml Non lactose fermenting gram negative marcus*    WBC 01/14/2020 5 60     RBC 01/14/2020 4 84     Hemoglobin 01/14/2020 14 8     Hematocrit 01/14/2020 44 2     MCV 01/14/2020 91     MCH 01/14/2020 30 5     MCHC 01/14/2020 33 5     RDW 01/14/2020 13 8     MPV 01/14/2020 7 6*    Platelets 82/70/1235 181     Neutrophils Relative 01/14/2020 64     Lymphocytes Relative 01/14/2020 23*    Monocytes Relative 01/14/2020 10     Eosinophils Relative 01/14/2020 3     Basophils Relative 01/14/2020 0     Neutrophils Absolute 01/14/2020 3 60     Lymphocytes Absolute 01/14/2020 1 30     Monocytes Absolute 01/14/2020 0 60     Eosinophils Absolute 01/14/2020 0 10     Basophils Absolute 01/14/2020 0 00     Sodium 01/14/2020 139     Potassium 01/14/2020 3 7     Chloride 01/14/2020 102     CO2 01/14/2020 28     ANION GAP 01/14/2020 9     BUN 01/14/2020 16     Creatinine 01/14/2020 0 96     Glucose 01/14/2020 99     Glucose, Fasting 01/14/2020 99     Calcium 01/14/2020 9 1     AST 01/14/2020 16     ALT 01/14/2020 11     Alkaline Phosphatase 01/14/2020 56     Total Protein 01/14/2020 6 9     Albumin 01/14/2020 3 4     Total Bilirubin 01/14/2020 0 60     eGFR 01/14/2020 54*    Cholesterol 01/14/2020 139     Triglycerides 01/14/2020 165*    HDL, Direct 01/14/2020 20*    LDL Calculated 01/14/2020 86     Non-HDL-Chol (CHOL-HDL) 01/14/2020 119     RPR 01/14/2020 Non-Reactive     TSH 3RD GENERATON 01/14/2020 0 738     Ventricular Rate 01/13/2020 88     Atrial Rate 01/13/2020 357     QRSD Interval 01/13/2020 76     QT Interval 01/13/2020 404     QTC Interval 01/13/2020 488     QRS Axis 01/13/2020 21     T Wave Axis 01/13/2020 17     Ventricular Rate 01/14/2020 82     Atrial Rate 01/14/2020 326     QRSD Interval 01/14/2020 74     QT Interval 01/14/2020 410     QTC Interval 01/14/2020 479     QRS Axis 01/14/2020 23     T Wave [place] 01/14/2020 44        Discharge Medications:    See after visit summary for reconciled discharge medications provided to patient and family  Discharge instructions/Information to patient and family:     See after visit summary for information provided to patient and family  Provisions for Follow-Up Care:    See after visit summary for information related to follow-up care and any pertinent home health orders  Discharge Statement     I spent 30 minutes discharging the patient  This time was spent on the day of discharge  I had direct contact with the patient on the day of discharge       Additional documentation is required if more than 30 minutes were spent on discharge:

## 2020-01-22 NOTE — NURSING NOTE
Patient in hallway, eating breakfast  Patient appears bright and has no complaints at this time   Patient compliant with medications

## 2020-01-22 NOTE — PROGRESS NOTES
01/22/20 1000   Activity/Group Checklist   Group Community meeting   Attendance Did not attend   Pt scheduled for discharge today  Relapse prevention plan not formally completed by patient yet patient is returning to supportive facility environment however

## 2020-01-22 NOTE — PROGRESS NOTES
01/22/20 0848   Team Meeting   Meeting Type Daily Rounds   Team Members Present   Team Members Present Physician;Nurse;; Other (Discipline and Name)   Physician Team Member Juvenal Purdy   Nursing Team Member [facility] Management Team Member Heidi Judd   Other (Discipline and Name) Fabio Burnett     Reviewed case with team  D/C today, 1230 pm p/u

## 2020-01-22 NOTE — CASE MANAGEMENT
[facility] [address] (L: [phone])

## 2020-01-22 NOTE — NURSING NOTE
Patient sat in chair at desk, soft spoken and calm this evening  No preoccupation with being dirty tonight  Small skin tear on right lower lateral anterior shin after TEDS applied  Area cleansed and band aid applied  TEDS removed at HS  Legs elevated with pillow to prevent pressure on heels, incontinence care done

## 2020-01-22 NOTE — PROGRESS NOTES
Pt smiled during group and mainly sleepy during session  01/22/20 1100   Activity/Group Checklist   Group Other (Comment)  (short term problem solving)   Attendance Attended   Attendance Duration (min) 31-45   Interactions Did not interact   Affect/Mood Calm   Goals Achieved Identified relapse prevention strategies; Able to listen to others

## 2020-01-22 NOTE — PLAN OF CARE
Problem: Potential for Falls  Goal: Patient will remain free of falls  Description  INTERVENTIONS:  - Assess patient frequently for physical needs  -  Identify cognitive and physical deficits and behaviors that affect risk of falls    -  [place] fall precautions as indicated by assessment   - Educate patient/family on patient safety including physical limitations  - Instruct patient to call for assistance with activity based on assessment  - Modify environment to reduce risk of injury  - Consider OT/PT consult to assist with strengthening/mobility  Outcome: Progressing     Problem: Prexisting or High Potential for Compromised Skin Integrity  Goal: Skin integrity is maintained or improved  Description  INTERVENTIONS:  - Identify patients at risk for skin breakdown  - Assess and monitor skin integrity  - Assess and monitor nutrition and hydration status  - Monitor labs   - Assess for incontinence   - Turn and reposition patient  - Assist with mobility/ambulation  - Relieve pressure over bony prominences  - Avoid friction and shearing  - Provide appropriate hygiene as needed including keeping skin clean and dry  - Evaluate need for skin moisturizer/barrier cream  - Collaborate with interdisciplinary team   - Patient/family teaching  - Consider wound care consult   Outcome: Progressing     Problem: DEPRESSION  Goal: Will be euthymic at discharge  Description  INTERVENTIONS:  - Administer medication as ordered  - Provide emotional support via 1:1 interaction with staff  - Encourage involvement in milieu/groups/activities  - Monitor for social isolation  Outcome: Progressing     Problem: Alteration in Orientation  Goal: Treatment Goal: Demonstrate a reduction of confusion and improved orientation to person, place, time and/or situation upon discharge, according to optimum baseline/ability  Outcome: Progressing     Problem: Ineffective Coping  Goal: Participates in unit activities  Description  Interventions:  - Provide therapeutic environment   - Provide required programming   - Redirect inappropriate behaviors   Outcome: Progressing

## 2020-02-01 NOTE — H&P
The following was copied and pasted from my own note    The patient was seen with José Miguel Beckman MS3, psychiatric evaluation note was reviewed and case discussed with treatment team   This 80years old   female has had progressive cognitive decline since an unknown date and she also has a history of recurrent episodes of major depression with previous hospitalizations  In the past week or so, the family noted increased depressive symptoms with crying spells and death wishes and paranoid delusions and auditory and visual hallucinations and perhaps illusions  The patient has a history of multiple hospitalizations  In her 25s, she was hospitalized and received ECT  Because of her cognitive decline, she cannot provide any reliable information  Mental status examination reveals an elderly  female who is dressed in hospital gown, somewhat disheveled  She has good eye contact and interacts well  Her speech is soft and hypophonic but has a normal rate  At times she has a tendency to derail but is coherent for the most part  She is mistrustful and paranoid  She reports auditory and visual hallucinations and illusions  She is oriented to person, knows the month but not the year, knows the president [facility]  Diagnosis is consistent with major depression, recurrent with psychotic features  A workup for UTI is in progress  We will continue with sertraline and I will add Abilify    The patient's QTC interval is 488  Further detail is documented in the H and P prepared by medical student

## 2022-03-31 NOTE — NURSING NOTE
Pt was calm, compliant with care and med given crushed in apple sauce  No distress noted during the night  Pt was showered yesterday, skin care and incontinence care were done  Negative

## (undated) DEVICE — TUBING SUCTION 5MM X 12 FT

## (undated) DEVICE — Device

## (undated) DEVICE — MEDI-VAC YANKAUER SUCTION HANDLE W/STRAIGHT TIP & CONTROL VENT: Brand: CARDINAL HEALTH